# Patient Record
Sex: FEMALE | Race: WHITE | Employment: UNEMPLOYED | URBAN - METROPOLITAN AREA
[De-identification: names, ages, dates, MRNs, and addresses within clinical notes are randomized per-mention and may not be internally consistent; named-entity substitution may affect disease eponyms.]

---

## 2019-09-16 ENCOUNTER — CLINICAL SUPPORT (OUTPATIENT)
Dept: URGENT CARE | Facility: CLINIC | Age: 54
End: 2019-09-16
Payer: MEDICAID

## 2019-09-16 VITALS
WEIGHT: 268 LBS | DIASTOLIC BLOOD PRESSURE: 85 MMHG | HEIGHT: 64 IN | SYSTOLIC BLOOD PRESSURE: 175 MMHG | TEMPERATURE: 98 F | HEART RATE: 87 BPM | OXYGEN SATURATION: 96 % | BODY MASS INDEX: 45.75 KG/M2

## 2019-09-16 DIAGNOSIS — J18.9 PNEUMONIA OF RIGHT LOWER LOBE DUE TO INFECTIOUS ORGANISM: Primary | ICD-10-CM

## 2019-09-16 DIAGNOSIS — R05.9 COUGH: ICD-10-CM

## 2019-09-16 LAB
CTP QC/QA: YES
FLUAV AG NPH QL: NEGATIVE
FLUBV AG NPH QL: NEGATIVE

## 2019-09-16 PROCEDURE — 87804 INFLUENZA ASSAY W/OPTIC: CPT | Mod: QW,,, | Performed by: NURSE PRACTITIONER

## 2019-09-16 PROCEDURE — 87804 POCT INFLUENZA A/B: ICD-10-PCS | Mod: QW,,, | Performed by: NURSE PRACTITIONER

## 2019-09-16 PROCEDURE — 71046 X-RAY EXAM CHEST 2 VIEWS: CPT | Mod: S$GLB,,, | Performed by: NURSE PRACTITIONER

## 2019-09-16 PROCEDURE — 99204 OFFICE O/P NEW MOD 45 MIN: CPT | Mod: 25,S$GLB,, | Performed by: NURSE PRACTITIONER

## 2019-09-16 PROCEDURE — 99204 PR OFFICE/OUTPT VISIT, NEW, LEVL IV, 45-59 MIN: ICD-10-PCS | Mod: 25,S$GLB,, | Performed by: NURSE PRACTITIONER

## 2019-09-16 PROCEDURE — 71046 PR XRAY, CHEST, 2 VIEWS: ICD-10-PCS | Mod: S$GLB,,, | Performed by: NURSE PRACTITIONER

## 2019-09-16 RX ORDER — CHOLECALCIFEROL (VITAMIN D3) 10 MCG
TABLET ORAL DAILY
COMMUNITY

## 2019-09-16 RX ORDER — METOCLOPRAMIDE 5 MG/1
5 TABLET ORAL 4 TIMES DAILY
COMMUNITY

## 2019-09-16 RX ORDER — CYCLOBENZAPRINE HCL 10 MG
10 TABLET ORAL 3 TIMES DAILY PRN
COMMUNITY

## 2019-09-16 RX ORDER — BENAZEPRIL HYDROCHLORIDE 40 MG/1
40 TABLET ORAL DAILY
COMMUNITY

## 2019-09-16 RX ORDER — DICLOFENAC SODIUM 10 MG/G
2 GEL TOPICAL 4 TIMES DAILY
COMMUNITY

## 2019-09-16 RX ORDER — INSULIN PUMP SYRINGE, 3 ML
EACH MISCELLANEOUS
COMMUNITY

## 2019-09-16 RX ORDER — METFORMIN HYDROCHLORIDE EXTENDED-RELEASE TABLETS 500 MG/1
500 TABLET, FILM COATED, EXTENDED RELEASE ORAL
COMMUNITY

## 2019-09-16 RX ORDER — PROMETHAZINE HYDROCHLORIDE AND DEXTROMETHORPHAN HYDROBROMIDE 6.25; 15 MG/5ML; MG/5ML
5 SYRUP ORAL
Qty: 120 ML | Refills: 0 | Status: SHIPPED | OUTPATIENT
Start: 2019-09-16 | End: 2019-09-26

## 2019-09-16 RX ORDER — PANTOPRAZOLE SODIUM 40 MG/1
40 TABLET, DELAYED RELEASE ORAL DAILY
COMMUNITY

## 2019-09-16 RX ORDER — AZITHROMYCIN 250 MG/1
TABLET, FILM COATED ORAL
Qty: 6 TABLET | Refills: 0 | Status: SHIPPED | OUTPATIENT
Start: 2019-09-16

## 2019-09-16 RX ORDER — LEVOTHYROXINE SODIUM 50 UG/1
50 TABLET ORAL DAILY
COMMUNITY

## 2019-09-16 RX ORDER — BLOOD-GLUCOSE CONTROL, NORMAL
EACH MISCELLANEOUS
COMMUNITY

## 2019-09-16 RX ORDER — ALBUTEROL SULFATE 90 UG/1
2 AEROSOL, METERED RESPIRATORY (INHALATION) EVERY 4 HOURS PRN
Qty: 1 INHALER | Refills: 0 | Status: SHIPPED | OUTPATIENT
Start: 2019-09-16

## 2019-09-16 NOTE — PROGRESS NOTES
"Subjective:       Patient ID: Sanaz Barr is a 53 y.o. female.    Vitals:  height is 5' 4" (1.626 m) and weight is 121.6 kg (268 lb). Her oral temperature is 97.5 °F (36.4 °C). Her blood pressure is 175/85 (abnormal) and her pulse is 87. Her oxygen saturation is 96%.     Chief Complaint: Cough    Sanaz Barr is a 53 year old female presenting to the clinic with c/o body aches that began yesterday. She began having cough and has had fever up to 102 with nasal congestion as well. She has been taking mucinex, tylenol, and ibuprofen.     Cough   This is a new problem. The current episode started yesterday. The problem has been gradually worsening. The problem occurs constantly. The cough is productive of sputum. Associated symptoms include ear congestion, a fever, myalgias (body aches), nasal congestion, postnasal drip and rhinorrhea. Pertinent negatives include no chest pain, chills, headaches, rash, sore throat or shortness of breath. Treatments tried: Mucinex. The treatment provided no relief.       Constitution: Positive for fever. Negative for chills and fatigue.   HENT: Positive for postnasal drip. Negative for congestion and sore throat.    Neck: Negative for painful lymph nodes.   Cardiovascular: Negative for chest pain and leg swelling.   Eyes: Negative for double vision and blurred vision.   Respiratory: Positive for cough. Negative for shortness of breath.    Gastrointestinal: Negative for nausea, vomiting and diarrhea.   Genitourinary: Negative for dysuria, frequency, urgency and history of kidney stones.   Musculoskeletal: Positive for muscle ache (body aches). Negative for joint pain, joint swelling and muscle cramps.   Skin: Negative for color change, pale, rash and bruising.   Allergic/Immunologic: Negative for seasonal allergies.   Neurological: Negative for dizziness, history of vertigo, light-headedness, passing out and headaches.   Hematologic/Lymphatic: Negative for swollen lymph nodes. "   Psychiatric/Behavioral: Negative for nervous/anxious, sleep disturbance and depression. The patient is not nervous/anxious.        Objective:      Physical Exam   Constitutional: She is oriented to person, place, and time. She appears well-developed and well-nourished. She is cooperative.  Non-toxic appearance. She does not appear ill. No distress.   HENT:   Head: Normocephalic and atraumatic.   Right Ear: Hearing, tympanic membrane, external ear and ear canal normal.   Left Ear: Hearing, tympanic membrane, external ear and ear canal normal.   Nose: Nose normal. No mucosal edema, rhinorrhea or nasal deformity. No epistaxis. Right sinus exhibits no maxillary sinus tenderness and no frontal sinus tenderness. Left sinus exhibits no maxillary sinus tenderness and no frontal sinus tenderness.   Mouth/Throat: Uvula is midline, oropharynx is clear and moist and mucous membranes are normal. No trismus in the jaw. Normal dentition. No uvula swelling. No posterior oropharyngeal erythema.   Eyes: Conjunctivae and lids are normal. Right eye exhibits no discharge. Left eye exhibits no discharge. No scleral icterus.   Sclera clear bilat   Neck: Trachea normal, normal range of motion, full passive range of motion without pain and phonation normal. Neck supple.   Cardiovascular: Normal rate, regular rhythm, normal heart sounds, intact distal pulses and normal pulses.   Pulmonary/Chest: Effort normal and breath sounds normal. No respiratory distress.   Abdominal: Soft. Normal appearance and bowel sounds are normal. She exhibits no distension, no pulsatile midline mass and no mass. There is no tenderness.   Musculoskeletal: Normal range of motion. She exhibits no edema or deformity.   Neurological: She is alert and oriented to person, place, and time. She exhibits normal muscle tone. Coordination normal.   Skin: Skin is warm, dry and intact. She is not diaphoretic. No pallor.   Psychiatric: She has a normal mood and affect. Her  speech is normal and behavior is normal. Judgment and thought content normal. Cognition and memory are normal.   Nursing note and vitals reviewed.      Assessment:       1. Pneumonia of right lower lobe due to infectious organism    2. Cough        Plan:       Patient's xray independently interpreted by me and confirmed by radiology. Mild infiltrate in right lower lobe consistent with pneumonia. Will treat with azithromycin, albuterol, and promethazine DM. Do not suspect sepsis. She is not hypoxic or in any distress. Instructed patient to follow up with her PCP in one week for repeat CXR. Strict ER precautions discussed and patient verbalized understanding.     Pneumonia of right lower lobe due to infectious organism    Cough  -     POCT Influenza A/B  -     XR CHEST PA AND LATERAL; Future; Expected date: 09/16/2019    Other orders  -     azithromycin (ZITHROMAX Z-GREGORY) 250 MG tablet; Take 2 tablets PO QD on day one; take 1 tablet PO QD on days 2-5.  Dispense: 6 tablet; Refill: 0  -     promethazine-dextromethorphan (PROMETHAZINE-DM) 6.25-15 mg/5 mL Syrp; Take 5 mLs by mouth every 4 to 6 hours as needed (cough).  Dispense: 120 mL; Refill: 0  -     albuterol (PROVENTIL/VENTOLIN HFA) 90 mcg/actuation inhaler; Inhale 2 puffs into the lungs every 4 (four) hours as needed for Wheezing or Shortness of Breath.  Dispense: 1 Inhaler; Refill: 0

## 2019-09-16 NOTE — PATIENT INSTRUCTIONS
What is Pneumonia?    Pneumonia is a serious lung infection. Many cases of pneumonia are caused by bacteria or viruses. Fungi may also cause pneumonia, but this is less common.  You may also get pneumonia after another illness, such as a cold, flu, or bronchitis. Those most at risk include older adults, smokers, and people with long-term (chronic) health problems or weak immune systems.  Healthy lungs  · Air travels in and out of the lungs through tubes called airways.  · The tubes branch into smaller passages called bronchioles. These end in tiny sacs called alveoli.  · Blood vessels surrounding the alveoli take oxygen into the bloodstream. At the same time, the alveoli remove carbon dioxide (a waste gas) from the blood. The carbon dioxide is then exhaled.  When you have pneumonia  · Pneumonia causes the bronchioles and the alveoli to fill with excess mucus and become inflamed.  · Your bodys response may be to cough. This can help clear out the fluid.  · The fluid (or mucus) you cough up may appear green or dark yellow.  · The excess mucus may make you feel short of breath.  · The inflammation and infection may give you a fever.  What are the symptoms?  Symptoms of pneumonia can come without warning. At first, you may think you have a cold or flu. But symptoms may get worse quickly, turning into pneumonia. Symptoms can be different for bacterial and viral pneumonia. Common symptoms may include the following:  · Severe cough with green or yellow mucus that doesn't improve or that gets worse  · Fever and chills  · Nausea, vomiting or diarrhea  · Shortness of breath with normal daily activities  · Increased heart rate  · Chest pain or discomfort when breathing in or coughing  · Headache  · Excessive sweating and clammy skin  Date Last Reviewed: 12/1/2016  © 4106-1484 [employer]. [address] [place] [address], [place] [address]. All rights reserved. This information is not intended as a substitute for  professional medical care. Always follow your healthcare professional's instructions.        Treating Pneumonia  Pneumonia is an infection of one or both of the lungs. Pneumonia:  · Is usually caused by either a virus or a bacteria  · Can be very serious, especially in infants, young children, and older adults. Its also serious for those with other long-term health problems or weakened immune systems.  · Is sometimes treated at home and sometimes in the hospital    Antibiotic medicines  Antibiotics may be prescribed for pneumonia caused by bacteria. They may be pills (oral medicines), or shots (injections). Or they may be given by IV (intravenously) into a vein. If you are taking oral medicines at home:  · Fill your prescription and start taking your medicine as soon as you can.  · You will likely start to feel better in a day or 2, but dont stop taking the antibiotic.  · Use a pill organizer to help you remember to take your medicine.  · Let your healthcare provider know if you have side effects.  · Take your medicine exactly as directed on the label. Talk to your provider or pharmacist if you have any questions.  Antiviral medicines  Antiviral medicine may be prescribed for pneumonia caused by a virus. For example, antiviral medicine may be prescribed for pneumonia caused by the flu virus. Antibiotics do not work against viruses. If you are taking antiviral medicine at home:  · Fill your prescription and start taking your medicine as soon as you can.  · Talk with your provider or pharmacist about possible side effects.  · Take the medicine exactly as instructed.  To relieve symptoms  There are many medicines that can help relieve symptoms of pneumonia. Some are prescription and some are over-the-counter.  Your healthcare provider may recommend:  · Acetaminophen or ibuprofen to lower your fever and to lessen headache or other pain  · Cough medicine to loosen mucus or to reduce coughing  Make sure you check with  your healthcare provider or pharmacist before taking any over-the-counter medicines.  Special treatments  If you are hospitalized for pneumonia, you may have other therapies, including:  · Inhaled medicines to help with breathing or chest congestion  · Supplemental oxygen to increase low oxygen levels  Drink fluids and eat healthy  You should eat healthy to help your body fight the infection. Drinking a lot of fluids helps to replace fluids lost from fever and to loosen mucus in your chest.  · Diet. Make healthy food choices, including fruits and vegetables, lean meats and other proteins, 100% whole grain and low- or no-fat dairy products.  · Fluids. Drink at least 6 to 8 tall glasses a day. Water and 100% fruit or vegetable juice are best.  Get plenty of rest and sleep  You may be more tired than usual for a while. It is important to get enough sleep at night. Its also important to rest during the day. Talk with your healthcare provider if coughing or other symptoms are interfering with your sleep.  Preventing the spread of germs  The best thing you can do to prevent spreading germs is to wash your hands often. You should:  · Rub your hand with soap and water for 20 to 30 seconds.  · Clean in between your fingers, the backs of your hands, and around your nails.  · Dry your hands on a separate towel or use paper towels.  You should also:  · Keep alcohol-based hand  nearby.  · Make sure you also clean surfaces that you touch. Use a product that kills all types of germs.  · Stay away from others until you are feeling better.  When to call your healthcare provider  Call your healthcare provider if you have any of the following:  · Symptoms get worse  · Fever continues  · Shortness of breath gets worse  · Increased mucus or mucus that is darker in color  · Coughing gets worse  · Lips or fingers are bluish in color  · Side effects from your medicine   Date Last Reviewed: 12/1/2016  © 6471-2804 [employer]. [address] [place] [address], [place] [address]. All rights reserved. This information is not intended as a substitute for professional medical care. Always follow your healthcare professional's instructions.

## 2021-04-22 ENCOUNTER — TELEPHONE (OUTPATIENT)
Dept: RHEUMATOLOGY | Facility: CLINIC | Age: 56
End: 2021-04-22

## 2021-06-09 DIAGNOSIS — R20.2 PARESTHESIA OF SKIN: ICD-10-CM

## 2021-06-09 DIAGNOSIS — R42 DIZZINESS: ICD-10-CM

## 2021-06-09 DIAGNOSIS — R29.90 MULTIPLE NEUROLOGICAL SYMPTOMS: Primary | ICD-10-CM

## 2021-06-09 DIAGNOSIS — G25.2 ACTION TREMOR: ICD-10-CM

## 2021-06-09 DIAGNOSIS — R20.0 ANESTHESIA OF SKIN: ICD-10-CM

## 2021-06-17 ENCOUNTER — HOSPITAL ENCOUNTER (OUTPATIENT)
Dept: RADIOLOGY | Facility: HOSPITAL | Age: 56
Discharge: HOME | End: 2021-06-17
Attending: NURSE PRACTITIONER
Payer: MEDICAID

## 2021-06-17 DIAGNOSIS — G25.2 ACTION TREMOR: ICD-10-CM

## 2021-06-17 DIAGNOSIS — R20.0 ANESTHESIA OF SKIN: ICD-10-CM

## 2021-06-17 DIAGNOSIS — R20.2 PARESTHESIA OF SKIN: ICD-10-CM

## 2021-06-17 DIAGNOSIS — R29.90 MULTIPLE NEUROLOGICAL SYMPTOMS: ICD-10-CM

## 2021-06-17 DIAGNOSIS — R42 DIZZINESS: ICD-10-CM

## 2021-06-17 LAB
CREAT SERPL-MCNC: 0.8 MG/DL (ref 0.5–1.4)
SAMPLE: NORMAL

## 2021-06-17 PROCEDURE — 82565 ASSAY OF CREATININE: CPT | Mod: PO

## 2021-06-17 PROCEDURE — 25500020 PHARM REV CODE 255: Mod: PO | Performed by: NURSE PRACTITIONER

## 2021-06-17 PROCEDURE — 70553 MRI BRAIN STEM W/O & W/DYE: CPT | Mod: TC,PO

## 2021-06-17 PROCEDURE — A9585 GADOBUTROL INJECTION: HCPCS | Mod: PO | Performed by: NURSE PRACTITIONER

## 2021-06-17 PROCEDURE — 72156 MRI NECK SPINE W/O & W/DYE: CPT | Mod: TC,PO

## 2021-06-17 RX ORDER — GADOBUTROL 604.72 MG/ML
10 INJECTION INTRAVENOUS
Status: COMPLETED | OUTPATIENT
Start: 2021-06-17 | End: 2021-06-17

## 2021-06-17 RX ADMIN — GADOBUTROL 10 ML: 604.72 INJECTION INTRAVENOUS at 11:06

## 2022-09-15 ENCOUNTER — OFFICE VISIT (OUTPATIENT)
Dept: RHEUMATOLOGY | Facility: CLINIC | Age: 57
End: 2022-09-15
Payer: MEDICAID

## 2022-09-15 VITALS
HEIGHT: 64 IN | OXYGEN SATURATION: 98 % | DIASTOLIC BLOOD PRESSURE: 80 MMHG | RESPIRATION RATE: 20 BRPM | HEART RATE: 76 BPM | BODY MASS INDEX: 42.48 KG/M2 | SYSTOLIC BLOOD PRESSURE: 130 MMHG | WEIGHT: 248.81 LBS

## 2022-09-15 DIAGNOSIS — E66.01 MORBID OBESITY: ICD-10-CM

## 2022-09-15 DIAGNOSIS — Z79.1 NSAID LONG-TERM USE: ICD-10-CM

## 2022-09-15 DIAGNOSIS — Z71.89 COUNSELING AND COORDINATION OF CARE: ICD-10-CM

## 2022-09-15 DIAGNOSIS — M79.7 FIBROMYALGIA: Primary | ICD-10-CM

## 2022-09-15 PROCEDURE — 3008F PR BODY MASS INDEX (BMI) DOCUMENTED: ICD-10-PCS | Mod: CPTII,,, | Performed by: INTERNAL MEDICINE

## 2022-09-15 PROCEDURE — 3075F SYST BP GE 130 - 139MM HG: CPT | Mod: CPTII,,, | Performed by: INTERNAL MEDICINE

## 2022-09-15 PROCEDURE — 3079F PR MOST RECENT DIASTOLIC BLOOD PRESSURE 80-89 MM HG: ICD-10-PCS | Mod: CPTII,,, | Performed by: INTERNAL MEDICINE

## 2022-09-15 PROCEDURE — 3075F PR MOST RECENT SYSTOLIC BLOOD PRESS GE 130-139MM HG: ICD-10-PCS | Mod: CPTII,,, | Performed by: INTERNAL MEDICINE

## 2022-09-15 PROCEDURE — 1159F MED LIST DOCD IN RCRD: CPT | Mod: CPTII,,, | Performed by: INTERNAL MEDICINE

## 2022-09-15 PROCEDURE — 99999 PR PBB SHADOW E&M-EST. PATIENT-LVL V: CPT | Mod: PBBFAC,,, | Performed by: INTERNAL MEDICINE

## 2022-09-15 PROCEDURE — 1159F PR MEDICATION LIST DOCUMENTED IN MEDICAL RECORD: ICD-10-PCS | Mod: CPTII,,, | Performed by: INTERNAL MEDICINE

## 2022-09-15 PROCEDURE — 4010F ACE/ARB THERAPY RXD/TAKEN: CPT | Mod: CPTII,,, | Performed by: INTERNAL MEDICINE

## 2022-09-15 PROCEDURE — 99205 OFFICE O/P NEW HI 60 MIN: CPT | Mod: S$PBB,,, | Performed by: INTERNAL MEDICINE

## 2022-09-15 PROCEDURE — 4010F PR ACE/ARB THEARPY RXD/TAKEN: ICD-10-PCS | Mod: CPTII,,, | Performed by: INTERNAL MEDICINE

## 2022-09-15 PROCEDURE — 99999 PR PBB SHADOW E&M-EST. PATIENT-LVL V: ICD-10-PCS | Mod: PBBFAC,,, | Performed by: INTERNAL MEDICINE

## 2022-09-15 PROCEDURE — 3079F DIAST BP 80-89 MM HG: CPT | Mod: CPTII,,, | Performed by: INTERNAL MEDICINE

## 2022-09-15 PROCEDURE — 3008F BODY MASS INDEX DOCD: CPT | Mod: CPTII,,, | Performed by: INTERNAL MEDICINE

## 2022-09-15 PROCEDURE — 99215 OFFICE O/P EST HI 40 MIN: CPT | Mod: PBBFAC,PO | Performed by: INTERNAL MEDICINE

## 2022-09-15 PROCEDURE — 99205 PR OFFICE/OUTPT VISIT, NEW, LEVL V, 60-74 MIN: ICD-10-PCS | Mod: S$PBB,,, | Performed by: INTERNAL MEDICINE

## 2022-09-15 RX ORDER — GABAPENTIN 100 MG/1
100 CAPSULE ORAL NIGHTLY
Qty: 30 CAPSULE | Refills: 11 | Status: SHIPPED | OUTPATIENT
Start: 2022-09-15 | End: 2022-11-08

## 2022-09-15 RX ORDER — TRAZODONE HYDROCHLORIDE 100 MG/1
100 TABLET ORAL NIGHTLY
COMMUNITY
Start: 2022-09-14

## 2022-09-15 RX ORDER — HYDROCHLOROTHIAZIDE 12.5 MG/1
12.5 TABLET ORAL
COMMUNITY

## 2022-09-15 RX ORDER — PROPRANOLOL HYDROCHLORIDE 40 MG/1
40 TABLET ORAL DAILY PRN
COMMUNITY
Start: 2022-09-14

## 2022-09-15 RX ORDER — MELOXICAM 15 MG/1
15 TABLET ORAL DAILY
Qty: 30 TABLET | Refills: 6 | Status: SHIPPED | OUTPATIENT
Start: 2022-09-15 | End: 2022-11-08

## 2022-09-15 RX ORDER — ACETAMINOPHEN 500 MG
500 TABLET ORAL
COMMUNITY

## 2022-09-15 RX ORDER — CHLORTHALIDONE 25 MG/1
25 TABLET ORAL DAILY
COMMUNITY
Start: 2022-09-14

## 2022-09-15 RX ORDER — PROPRANOLOL HYDROCHLORIDE 20 MG/1
20 TABLET ORAL 2 TIMES DAILY
COMMUNITY
Start: 2022-08-31

## 2022-09-15 RX ORDER — METHOCARBAMOL 750 MG/1
750 TABLET, FILM COATED ORAL 4 TIMES DAILY PRN
Qty: 90 TABLET | Refills: 1 | Status: SHIPPED | OUTPATIENT
Start: 2022-09-15 | End: 2022-11-08

## 2022-09-15 RX ORDER — CYANOCOBALAMIN 1000 UG/ML
INJECTION, SOLUTION INTRAMUSCULAR; SUBCUTANEOUS
COMMUNITY
Start: 2022-09-14

## 2022-09-15 RX ORDER — POTASSIUM CHLORIDE 1.5 G/1.58G
20 POWDER, FOR SOLUTION ORAL
COMMUNITY

## 2022-09-15 NOTE — PROGRESS NOTES
RHEUMATOLOGY OUTPATIENT CLINIC NOTE    9/15/2022    Attending Rheumatologist: Erik Carrasco  Primary Care Provider: Primary Doctor No   Physician Requesting Consultation: Madyson Andrade, DO  [address] [place] [address]  Chief Complaint/Reason For Consultation:  No chief complaint on file.      Subjective:       HPI  Sanaz Barr is a 56 y.o. White female with medical history noted below who presents for evaluation of joint pain.     Patient presents for evaluation of joint pain and myalgias. She notes the myalgias date back to when she was 15 yo. Joint pain has been present over the last 25 years. Notes wide spread pain but worse areas are hands and knees. Reports Hx of Frozen shoulder bilaterally, has gone thru PT/CSI extensively, and has improved. Prior surgeries of the toes for hammer toes. Paraesthesias all overs. She reports jean horses all over as well. Occasional hand swelling. Morning stiffness lasting few hours. Long days worsen her pain, NSAIDs provide little relief. Brain fog/forgetful, dizziness, wt gain over the last 4-5 months, constipation/diarrhea, RLS, menopause now but prior menstrual irregularities, HA/Migraines, fatigue, poor sleep (on CPAP), Anxiety. No rash, fevers, wt loss, Raynaud's, Oral/Nasal Ulcers, SOB, Chest Pain, Depression. Reports Hx of Fibromyalgia, prior use of Lyrica, Gabapentin, Cymbalta, Elavil.     Review of Systems   Constitutional:  Positive for fatigue. Negative for chills, fever and unexpected weight change.   HENT:  Positive for trouble swallowing. Negative for mouth sores.    Eyes:  Positive for redness. Negative for eye dryness.   Respiratory:  Negative for cough and shortness of breath.    Cardiovascular:  Negative for chest pain.   Gastrointestinal:  Positive for constipation and diarrhea. Negative for abdominal distention, nausea and vomiting.   Genitourinary:  Negative for dysuria, genital sores and vaginal dryness.   Musculoskeletal:   Positive for arthralgias, back pain, joint swelling, leg pain and myalgias. Negative for gait problem, neck pain, neck stiffness and joint deformity.   Integumentary:  Negative for rash.   Neurological:  Positive for numbness and headaches. Negative for weakness.   Hematological:  Negative for adenopathy. Bruises/bleeds easily.   Psychiatric/Behavioral:  Positive for decreased concentration and sleep disturbance. Negative for confusion. The patient is nervous/anxious.    All other systems reviewed and are negative.     Chronic comorbid conditions affecting medical decision making today:  Past Medical History:   Diagnosis Date    Diabetes mellitus, type 2      Past Surgical History:   Procedure Laterality Date     SECTION      CHOLECYSTECTOMY      HAND SURGERY      Polyh removal       No family history on file.  Social History     Substance and Sexual Activity   Alcohol Use Not on file     Social History     Tobacco Use   Smoking Status Not on file   Smokeless Tobacco Not on file     Social History     Substance and Sexual Activity   Drug Use Not on file       Current Outpatient Medications:     acetaminophen (TYLENOL) 500 MG tablet, Take 500 mg by mouth., Disp: , Rfl:     benazepril (LOTENSIN) 40 MG tablet, Take 40 mg by mouth once daily., Disp: , Rfl:     blood sugar diagnostic (ONETOUCH ULTRA BLUE TEST STRIP MISC), by Misc.(Non-Drug; Combo Route) route., Disp: , Rfl:     blood-glucose meter kit, by Other route. Use as instructed, Disp: , Rfl:     chlorthalidone (HYGROTEN) 25 MG Tab, Take 25 mg by mouth once daily., Disp: , Rfl:     cholecalciferol, vitamin D3, capsule/tablet, Take by mouth once daily., Disp: , Rfl:     cyanocobalamin 1,000 mcg/mL injection, Inject into the muscle., Disp: , Rfl:     cyclobenzaprine (FLEXERIL) 10 MG tablet, Take 10 mg by mouth 3 (three) times daily as needed for Muscle spasms., Disp: , Rfl:     hydroCHLOROthiazide (HYDRODIURIL) 12.5 MG Tab, Take 12.5 mg by mouth., Disp: ,  Rfl:     lancets 30 gauge Misc, by Misc.(Non-Drug; Combo Route) route., Disp: , Rfl:     levothyroxine (SYNTHROID) 50 MCG tablet, Take 50 mcg by mouth once daily., Disp: , Rfl:     MAGNESIUM ORAL, Take by mouth., Disp: , Rfl:     potassium chloride (KLOR-CON) 20 mEq Pack, Take 20 mEq by mouth., Disp: , Rfl:     propranoloL (INDERAL) 20 MG tablet, Take 20 mg by mouth 2 (two) times daily., Disp: , Rfl:     propranoloL (INDERAL) 40 MG tablet, Take 40 mg by mouth daily as needed., Disp: , Rfl:     semaglutide (OZEMPIC) 0.25 mg or 0.5 mg(2 mg/1.5 mL) PnIj, Inject 0.25 mg into the skin every 7 days., Disp: , Rfl:     traZODone (DESYREL) 100 MG tablet, Take 100 mg by mouth every evening., Disp: , Rfl:     albuterol (PROVENTIL/VENTOLIN HFA) 90 mcg/actuation inhaler, Inhale 2 puffs into the lungs every 4 (four) hours as needed for Wheezing or Shortness of Breath., Disp: 1 Inhaler, Rfl: 0    azithromycin (ZITHROMAX Z-GREGORY) 250 MG tablet, Take 2 tablets PO QD on day one; take 1 tablet PO QD on days 2-5., Disp: 6 tablet, Rfl: 0    diclofenac sodium (VOLTAREN) 1 % Gel, Apply 2 g topically 4 (four) times daily., Disp: , Rfl:     gabapentin (NEURONTIN) 100 MG capsule, Take 1 capsule (100 mg total) by mouth every evening., Disp: 30 capsule, Rfl: 11    meloxicam (MOBIC) 15 MG tablet, Take 1 tablet (15 mg total) by mouth once daily., Disp: 30 tablet, Rfl: 6    metFORMIN (FORTAMET) 500 mg 24 hr tablet, Take 500 mg by mouth daily with breakfast., Disp: , Rfl:     methocarbamoL (ROBAXIN) 750 MG Tab, Take 1 tablet (750 mg total) by mouth 4 (four) times daily as needed (myalgia)., Disp: 90 tablet, Rfl: 1    metoclopramide HCl (REGLAN) 5 MG tablet, Take 5 mg by mouth 4 (four) times daily., Disp: , Rfl:     pantoprazole (PROTONIX) 40 MG tablet, Take 40 mg by mouth once daily., Disp: , Rfl:      Objective:         Vitals:    09/15/22 1344   BP: 130/80   Pulse: 76   Resp: 20     Physical Exam  Can make fist, no synovitis, puffy MCP with  TTP  Shoulder with limited ROM  Knee crepitus   Negative ankle/MTP  Tender Points:  No   Yes  []    [x]   Low cervical anterior aspect    []    [x]   Costochondral Junction   []    [x]   Lateral Epicondyle  []    [x]   Suboccipital   []    [x]   Trapezius   []    [x]   Supraspinatus   []    [x]   Gluteal   []    [x]   Greater trochanter  []    [x]   Knee    Reviewed old and all outside pertinent medical records available.    All lab results personally reviewed and interpreted by me.  No results found for: WBC, HGB, HCT, MCV, MCH, MCHC, RDW, PLT, MPV, NEUTROABS, LYMPHOABS, MONOABS, EOSINOABS, BASOSABS, NEUTROPCT, LYMPHOPCT, MONOPCT, EOSINOPCT, BASOPCT, DIFFTYPE, RBCMORPHOLOG, PLTEST    No results found for: NA, K, CL, CO2, GLU, BUN, LABCREA, CALCIUM, PROT, ALBUMIN, BILITOT, AST, ALKPHOS, ALT, GFRAA, GFRNONAA    No results found for: COLORU, APPEARANCEUA, SPECGRAV, PHUR, PHUA, PROTEINUA, GLUCOSEU, KETONESU, BLOODU, LEUKOCYTESUR, NITRITE, UROBILINOGEN    No results found for: CRP    No results found for: SEDRATE, ERYTHROCYTES    No results found for: SARTHAK, RF, SEDRATE    No components found for: 25OHVITDTOT, 27HQKELE1, 19APTNAR5, METHODNOTE    No results found for: URICACID    No components found for: TSPOTTB        Imaging:  All imaging reviewed and independently interpreted by me.         ASSESSMENT / PLAN:     Sanaz Barr is a 56 y.o. White female with:      1. Fibromyalgia  - patients complaints consistent with her history of Fibromyalgia, the question remains is there another process driving this  - work up as below   - discussed diagnosis and management  - give retrial of Gabapentin, SE discussed  - trial of Meloxicam, SE discussed  - will try switching Flexeril to Robaxin   - sleep hygiene and stress management discussed  - reassurance and exercise   - C-Reactive Protein; Future  - SARTHAK Screen w/Reflex; Future  - Rheumatoid Factor; Future  - CBC Auto Differential; Future  - Comprehensive Metabolic Panel;  Future  - Cyclic Citrullinated Peptide Antibody, IgG; Future  - Sjogrens syndrome-A extractable nuclear antibody; Future  - Sjogrens syndrome-B extractable nuclear antibody; Future  - Sedimentation rate; Future  - Vitamin D; Future  - Uric Acid; Future  - TSH; Future  - Hepatitis C Antibody; Future  - Hepatitis B Surface Antigen; Future  - Hepatitis B Surface Ab, Qualitative; Future  - CK; Future  - XR Arthritis Survey; Future  - Quantiferon Gold TB; Future  - HIV 1/2 Ag/Ab (4th Gen); Future  - gabapentin (NEURONTIN) 100 MG capsule; Take 1 capsule (100 mg total) by mouth every evening.  Dispense: 30 capsule; Refill: 11  - meloxicam (MOBIC) 15 MG tablet; Take 1 tablet (15 mg total) by mouth once daily.  Dispense: 30 tablet; Refill: 6  - methocarbamoL (ROBAXIN) 750 MG Tab; Take 1 tablet (750 mg total) by mouth 4 (four) times daily as needed (myalgia).  Dispense: 90 tablet; Refill: 1    2. Chronic NSAID use  - no history of GI bleed or coronary artery disease.  - previous labs without features of CKD.  - clinical significance side effect of long-term NSAID use discussed in detail.  - recommended for alternative therapies for pain management.    3. Other specified counseling  - over 10 minutes spent regarding below topics:  - Immunization counseling done.  - Weight loss counseling done.  - Nutrition and exercise counseling.  - Limitation of alcohol consumption.  - Regular exercise:  Aerobic and resistance.  - Medication counseling provided.    4. Morbid Obesity  - would benefit from decreasing at least 10% of body weight.  - recommended goal of losing 1 lb per week.  - consider nutritionist evaluation.      Follow up in about 8 weeks (around 11/10/2022).    Method of contact with patient concerns: [facility]    Disclaimer:  This note is prepared using voice recognition software and as such is likely to have errors and has not been proof read. Please contact me for questions.     Time spent: 60 minutes in  face to face discussion concerning diagnosis, prognosis, review of lab and test results, benefits of treatment as well as management of disease, counseling of patient and coordination of care between various health care providers.  Greater than half the time spent was used for coordination of care and counseling of patient.    Erik Carrasco M.D.  Rheumatology Department   [facility]

## 2022-09-16 ENCOUNTER — HOSPITAL ENCOUNTER (OUTPATIENT)
Dept: RADIOLOGY | Facility: CLINIC | Age: 57
Discharge: HOME | End: 2022-09-16
Attending: INTERNAL MEDICINE
Payer: MEDICAID

## 2022-09-16 DIAGNOSIS — M79.7 FIBROMYALGIA: ICD-10-CM

## 2022-09-16 PROCEDURE — 77077 JOINT SURVEY SINGLE VIEW: CPT | Mod: TC,PO

## 2022-09-16 PROCEDURE — 77077 JOINT SURVEY SINGLE VIEW: CPT | Mod: 26,,, | Performed by: RADIOLOGY

## 2022-09-16 PROCEDURE — 77077 XR ARTHRITIS SURVEY: ICD-10-PCS | Mod: 26,,, | Performed by: RADIOLOGY

## 2022-11-08 ENCOUNTER — OFFICE VISIT (OUTPATIENT)
Dept: RHEUMATOLOGY | Facility: CLINIC | Age: 57
End: 2022-11-08
Payer: MEDICAID

## 2022-11-08 VITALS
HEART RATE: 72 BPM | SYSTOLIC BLOOD PRESSURE: 124 MMHG | OXYGEN SATURATION: 96 % | HEIGHT: 64 IN | RESPIRATION RATE: 20 BRPM | DIASTOLIC BLOOD PRESSURE: 74 MMHG | WEIGHT: 241.81 LBS | BODY MASS INDEX: 41.28 KG/M2

## 2022-11-08 DIAGNOSIS — Z79.1 NSAID LONG-TERM USE: ICD-10-CM

## 2022-11-08 DIAGNOSIS — E66.01 MORBID OBESITY: ICD-10-CM

## 2022-11-08 DIAGNOSIS — M79.7 FIBROMYALGIA: Primary | ICD-10-CM

## 2022-11-08 DIAGNOSIS — Z71.89 COUNSELING AND COORDINATION OF CARE: ICD-10-CM

## 2022-11-08 DIAGNOSIS — M15.9 PRIMARY OSTEOARTHRITIS INVOLVING MULTIPLE JOINTS: ICD-10-CM

## 2022-11-08 PROCEDURE — 99214 OFFICE O/P EST MOD 30 MIN: CPT | Mod: PBBFAC,PO | Performed by: INTERNAL MEDICINE

## 2022-11-08 PROCEDURE — 99215 OFFICE O/P EST HI 40 MIN: CPT | Mod: S$PBB,,, | Performed by: INTERNAL MEDICINE

## 2022-11-08 PROCEDURE — 99215 PR OFFICE/OUTPT VISIT, EST, LEVL V, 40-54 MIN: ICD-10-PCS | Mod: S$PBB,,, | Performed by: INTERNAL MEDICINE

## 2022-11-08 PROCEDURE — 3078F PR MOST RECENT DIASTOLIC BLOOD PRESSURE < 80 MM HG: ICD-10-PCS | Mod: CPTII,,, | Performed by: INTERNAL MEDICINE

## 2022-11-08 PROCEDURE — 3074F SYST BP LT 130 MM HG: CPT | Mod: CPTII,,, | Performed by: INTERNAL MEDICINE

## 2022-11-08 PROCEDURE — 1159F MED LIST DOCD IN RCRD: CPT | Mod: CPTII,,, | Performed by: INTERNAL MEDICINE

## 2022-11-08 PROCEDURE — 3078F DIAST BP <80 MM HG: CPT | Mod: CPTII,,, | Performed by: INTERNAL MEDICINE

## 2022-11-08 PROCEDURE — 4010F PR ACE/ARB THEARPY RXD/TAKEN: ICD-10-PCS | Mod: CPTII,,, | Performed by: INTERNAL MEDICINE

## 2022-11-08 PROCEDURE — 3008F BODY MASS INDEX DOCD: CPT | Mod: CPTII,,, | Performed by: INTERNAL MEDICINE

## 2022-11-08 PROCEDURE — 4010F ACE/ARB THERAPY RXD/TAKEN: CPT | Mod: CPTII,,, | Performed by: INTERNAL MEDICINE

## 2022-11-08 PROCEDURE — 3008F PR BODY MASS INDEX (BMI) DOCUMENTED: ICD-10-PCS | Mod: CPTII,,, | Performed by: INTERNAL MEDICINE

## 2022-11-08 PROCEDURE — 1159F PR MEDICATION LIST DOCUMENTED IN MEDICAL RECORD: ICD-10-PCS | Mod: CPTII,,, | Performed by: INTERNAL MEDICINE

## 2022-11-08 PROCEDURE — 3074F PR MOST RECENT SYSTOLIC BLOOD PRESSURE < 130 MM HG: ICD-10-PCS | Mod: CPTII,,, | Performed by: INTERNAL MEDICINE

## 2022-11-08 PROCEDURE — 99999 PR PBB SHADOW E&M-EST. PATIENT-LVL IV: CPT | Mod: PBBFAC,,, | Performed by: INTERNAL MEDICINE

## 2022-11-08 PROCEDURE — 99999 PR PBB SHADOW E&M-EST. PATIENT-LVL IV: ICD-10-PCS | Mod: PBBFAC,,, | Performed by: INTERNAL MEDICINE

## 2022-11-08 RX ORDER — TIZANIDINE 4 MG/1
4 TABLET ORAL EVERY 6 HOURS PRN
Qty: 40 TABLET | Refills: 0 | Status: SHIPPED | OUTPATIENT
Start: 2022-11-08 | End: 2022-11-18

## 2022-11-08 RX ORDER — NABUMETONE 750 MG/1
750 TABLET, FILM COATED ORAL 2 TIMES DAILY
Qty: 60 TABLET | Refills: 6 | Status: SHIPPED | OUTPATIENT
Start: 2022-11-08 | End: 2022-11-09 | Stop reason: SDUPTHER

## 2022-11-08 NOTE — PROGRESS NOTES
RHEUMATOLOGY OUTPATIENT CLINIC NOTE    11/8/2022    Attending Rheumatologist: Erik Carrasco  Primary Care Provider: Primary Doctor No   Physician Requesting Consultation: No referring provider defined for this encounter.  Chief Complaint/Reason For Consultation:  No chief complaint on file.      Subjective:       HPI  Sanaz Barr is a 57 y.o. White female with medical history noted below who presents for evaluation of joint pain.   Patient presents for evaluation of joint pain and myalgias. She notes the myalgias date back to when she was 15 yo. Joint pain has been present over the last 25 years. Notes wide spread pain but worse areas are hands and knees. Reports Hx of Frozen shoulder bilaterally, has gone thru PT/CSI extensively, and has improved. Prior surgeries of the toes for hammer toes. Paraesthesias all overs. She reports jean horses all over as well. Occasional hand swelling. Morning stiffness lasting few hours. Long days worsen her pain, NSAIDs provide little relief. Brain fog/forgetful, dizziness, wt gain over the last 4-5 months, constipation/diarrhea, RLS, menopause now but prior menstrual irregularities, HA/Migraines, fatigue, poor sleep (on CPAP), Anxiety. No rash, fevers, wt loss, Raynaud's, Oral/Nasal Ulcers, SOB, Chest Pain, Depression. Reports Hx of Fibromyalgia, prior use of Lyrica, Gabapentin, Cymbalta, Elavil.     Today  Patient here for follow up.   Last visit presented for evaluation of joint pain and had extensive labs obtained. Also given Meloxicam, gabapentin and Robaxin. She notes stopping the Gabapentin due to low BP. Notes the other meds have not helped for pain or myalgias. Did research on low dose naltrexone and wants to try it.     Review of Systems   Constitutional:  Positive for fatigue. Negative for chills, fever and unexpected weight change.   HENT:  Positive for trouble swallowing. Negative for mouth sores.    Eyes:  Positive for redness. Negative for eye  dryness.   Respiratory:  Negative for cough and shortness of breath.    Cardiovascular:  Negative for chest pain.   Gastrointestinal:  Positive for constipation and diarrhea. Negative for abdominal distention, nausea and vomiting.   Genitourinary:  Negative for dysuria, genital sores and vaginal dryness.   Musculoskeletal:  Positive for arthralgias, back pain, joint swelling, leg pain and myalgias. Negative for gait problem, neck pain, neck stiffness and joint deformity.   Integumentary:  Negative for rash.   Neurological:  Positive for numbness and headaches. Negative for weakness.   Hematological:  Negative for adenopathy. Bruises/bleeds easily.   Psychiatric/Behavioral:  Positive for decreased concentration and sleep disturbance. Negative for confusion. The patient is nervous/anxious.    All other systems reviewed and are negative.     Chronic comorbid conditions affecting medical decision making today:  Past Medical History:   Diagnosis Date    Diabetes mellitus, type 2      Past Surgical History:   Procedure Laterality Date     SECTION      CHOLECYSTECTOMY      HAND SURGERY      Polyh removal       No family history on file.  Social History     Substance and Sexual Activity   Alcohol Use Not on file     Social History     Tobacco Use   Smoking Status Not on file   Smokeless Tobacco Not on file     Social History     Substance and Sexual Activity   Drug Use Not on file       Current Outpatient Medications:     acetaminophen (TYLENOL) 500 MG tablet, Take 500 mg by mouth., Disp: , Rfl:     albuterol (PROVENTIL/VENTOLIN HFA) 90 mcg/actuation inhaler, Inhale 2 puffs into the lungs every 4 (four) hours as needed for Wheezing or Shortness of Breath., Disp: 1 Inhaler, Rfl: 0    azithromycin (ZITHROMAX Z-GREGORY) 250 MG tablet, Take 2 tablets PO QD on day one; take 1 tablet PO QD on days 2-5., Disp: 6 tablet, Rfl: 0    benazepril (LOTENSIN) 40 MG tablet, Take 40 mg by mouth once daily., Disp: , Rfl:     blood sugar  diagnostic (ONETOUCH ULTRA BLUE TEST STRIP MISC), by Misc.(Non-Drug; Combo Route) route., Disp: , Rfl:     blood-glucose meter kit, by Other route. Use as instructed, Disp: , Rfl:     chlorthalidone (HYGROTEN) 25 MG Tab, Take 25 mg by mouth once daily., Disp: , Rfl:     cholecalciferol, vitamin D3, capsule/tablet, Take by mouth once daily., Disp: , Rfl:     cyanocobalamin 1,000 mcg/mL injection, Inject into the muscle., Disp: , Rfl:     cyclobenzaprine (FLEXERIL) 10 MG tablet, Take 10 mg by mouth 3 (three) times daily as needed for Muscle spasms., Disp: , Rfl:     diclofenac sodium (VOLTAREN) 1 % Gel, Apply 2 g topically 4 (four) times daily., Disp: , Rfl:     hydroCHLOROthiazide (HYDRODIURIL) 12.5 MG Tab, Take 12.5 mg by mouth., Disp: , Rfl:     lancets 30 gauge Misc, by Misc.(Non-Drug; Combo Route) route., Disp: , Rfl:     levothyroxine (SYNTHROID) 50 MCG tablet, Take 50 mcg by mouth once daily., Disp: , Rfl:     MAGNESIUM ORAL, Take by mouth., Disp: , Rfl:     metFORMIN (FORTAMET) 500 mg 24 hr tablet, Take 500 mg by mouth daily with breakfast., Disp: , Rfl:     metoclopramide HCl (REGLAN) 5 MG tablet, Take 5 mg by mouth 4 (four) times daily., Disp: , Rfl:     nabumetone (RELAFEN) 750 MG tablet, Take 1 tablet (750 mg total) by mouth 2 (two) times daily., Disp: 60 tablet, Rfl: 6    natrexone tablet 4 mg, Take 1 tablet (4 mg total) by mouth every evening., Disp: 30 tablet, Rfl: 1    pantoprazole (PROTONIX) 40 MG tablet, Take 40 mg by mouth once daily., Disp: , Rfl:     potassium chloride (KLOR-CON) 20 mEq Pack, Take 20 mEq by mouth., Disp: , Rfl:     propranoloL (INDERAL) 20 MG tablet, Take 20 mg by mouth 2 (two) times daily., Disp: , Rfl:     propranoloL (INDERAL) 40 MG tablet, Take 40 mg by mouth daily as needed., Disp: , Rfl:     semaglutide (OZEMPIC) 0.25 mg or 0.5 mg(2 mg/1.5 mL) PnIj, Inject 0.25 mg into the skin every 7 days., Disp: , Rfl:     tiZANidine (ZANAFLEX) 4 MG tablet, Take 1 tablet (4 mg total) by  mouth every 6 (six) hours as needed (myalgia/spasms)., Disp: 40 tablet, Rfl: 0    traZODone (DESYREL) 100 MG tablet, Take 100 mg by mouth every evening., Disp: , Rfl:      Objective:         Vitals:    11/08/22 1126   BP: 124/74   Pulse: 72   Resp: 20     Physical Exam  Can make fist, no synovitis, puffy MCP with TTP  Shoulder with limited ROM  Knee crepitus   Negative ankle/MTP  Tender Points:  No   Yes  []    [x]   Low cervical anterior aspect    []    [x]   Costochondral Junction   []    [x]   Lateral Epicondyle  []    [x]   Suboccipital   []    [x]   Trapezius   []    [x]   Supraspinatus   []    [x]   Gluteal   []    [x]   Greater trochanter  []    [x]   Knee    Reviewed old and all outside pertinent medical records available.    All lab results personally reviewed and interpreted by me.  Lab Results   Component Value Date    WBC 6.52 09/16/2022    HGB 12.8 09/16/2022    HCT 39.1 09/16/2022    MCV 92 09/16/2022    MCH 30.0 09/16/2022    MCHC 32.7 09/16/2022    RDW 14.2 09/16/2022     09/16/2022    MPV 11.3 09/16/2022       Lab Results   Component Value Date     09/16/2022    K 3.6 09/16/2022     09/16/2022    CO2 30 (H) 09/16/2022     (H) 09/16/2022    BUN 20 09/16/2022    CALCIUM 9.3 09/16/2022    PROT 6.7 09/16/2022    ALBUMIN 3.9 09/16/2022    BILITOT 1.0 09/16/2022    AST 17 09/16/2022    ALKPHOS 67 09/16/2022    ALT 22 09/16/2022       No results found for: COLORU, APPEARANCEUA, SPECGRAV, PHUR, PHUA, PROTEINUA, GLUCOSEU, KETONESU, BLOODU, LEUKOCYTESUR, NITRITE, UROBILINOGEN    Lab Results   Component Value Date    CRP 3.8 09/16/2022       Lab Results   Component Value Date    SEDRATE 11 09/16/2022       Lab Results   Component Value Date    RF <13.0 09/16/2022    SEDRATE 11 09/16/2022       No components found for: 25OHVITDTOT, 99UQSJQL6, 65DNYLJK4, METHODNOTE    Lab Results   Component Value Date    URICACID 8.0 (H) 09/16/2022       No components found for:  TSPOTTB        Imaging:  All imaging reviewed and independently interpreted by me.         ASSESSMENT / PLAN:     Sanaz Barr is a 57 y.o. White female with:      1. Fibromyalgia  - stable  - Gabapentin stopped due to low BP, Robaxin and Flexeril lost efficacy, Meloxicam no change in pain  - trial of Nabumetone, SE discussed    - trial of Zanaflex, SE discussed  - trial of Low dose Naltrexone, SE discussed  - sleep hygiene and stress management reinforced   - reassurance and exercise     2. Osteoarthritis  - in addition to FM she has OA which is adding to pain and will make pain goals harder to achieve  - discussed diagnosis and management  - NSAIDs/Tylenol PRN  - wt loss  - reassurance and exercise     3. Chronic NSAID use  - no history of GI bleed or coronary artery disease.  - previous labs without features of CKD.  - clinical significance side effect of long-term NSAID use discussed in detail.  - recommended for alternative therapies for pain management.    4. Other specified counseling  - over 10 minutes spent regarding below topics:  - Immunization counseling done.  - Weight loss counseling done.  - Nutrition and exercise counseling.  - Limitation of alcohol consumption.  - Regular exercise:  Aerobic and resistance.  - Medication counseling provided.    5. Morbid Obesity  - would benefit from decreasing at least 10% of body weight.  - recommended goal of losing 1 lb per week.  - consider nutritionist evaluation.      Follow up in about 3 months (around 2/8/2023).    Method of contact with patient concerns: [facility]    Disclaimer:  This note is prepared using voice recognition software and as such is likely to have errors and has not been proof read. Please contact me for questions.     Time spent: 40 minutes in face to face discussion concerning diagnosis, prognosis, review of lab and test results, benefits of treatment as well as management of disease, counseling of patient and coordination  of care between various health care providers.  Greater than half the time spent was used for coordination of care and counseling of patient.    Erik Carrasco M.D.  Rheumatology Department   [facility]

## 2022-11-08 NOTE — PROGRESS NOTES
Answers submitted by the patient for this visit:  Rheumatology Questionnaire (Submitted on 11/1/2022)  fever: No  eye redness: No  mouth sores: Yes  headaches: Yes  shortness of breath: No  chest pain: No  trouble swallowing: Yes  diarrhea: Yes  constipation: Yes  unexpected weight change: No  genital sore: No  dysuria: No  During the last 3 days, have you had a skin rash?: Yes  Bruises or bleeds easily: No  cough: No

## 2022-11-09 DIAGNOSIS — M79.7 FIBROMYALGIA: ICD-10-CM

## 2022-11-09 RX ORDER — NABUMETONE 750 MG/1
750 TABLET, FILM COATED ORAL 2 TIMES DAILY
Qty: 60 TABLET | Refills: 6 | Status: SHIPPED | OUTPATIENT
Start: 2022-11-09 | End: 2023-01-20 | Stop reason: SDUPTHER

## 2023-04-28 ENCOUNTER — OFFICE VISIT (OUTPATIENT)
Dept: URGENT CARE | Facility: CLINIC | Age: 58
End: 2023-04-28
Payer: MEDICAID

## 2023-04-28 VITALS
OXYGEN SATURATION: 97 % | BODY MASS INDEX: 40.97 KG/M2 | WEIGHT: 240 LBS | TEMPERATURE: 98 F | DIASTOLIC BLOOD PRESSURE: 85 MMHG | RESPIRATION RATE: 16 BRPM | SYSTOLIC BLOOD PRESSURE: 165 MMHG | HEART RATE: 64 BPM | HEIGHT: 64 IN

## 2023-04-28 DIAGNOSIS — H69.92 ACUTE DYSFUNCTION OF LEFT EUSTACHIAN TUBE: ICD-10-CM

## 2023-04-28 DIAGNOSIS — J06.9 VIRAL URI WITH COUGH: Primary | ICD-10-CM

## 2023-04-28 DIAGNOSIS — H92.02 ACUTE OTALGIA, LEFT: ICD-10-CM

## 2023-04-28 DIAGNOSIS — Z86.39 HISTORY OF DIABETES MELLITUS, TYPE II: ICD-10-CM

## 2023-04-28 DIAGNOSIS — Z86.79 HISTORY OF HYPERTENSION: ICD-10-CM

## 2023-04-28 PROCEDURE — 99204 OFFICE O/P NEW MOD 45 MIN: CPT | Mod: S$GLB,,, | Performed by: NURSE PRACTITIONER

## 2023-04-28 PROCEDURE — 99204 PR OFFICE/OUTPT VISIT, NEW, LEVL IV, 45-59 MIN: ICD-10-PCS | Mod: S$GLB,,, | Performed by: NURSE PRACTITIONER

## 2023-04-28 RX ORDER — BENZONATATE 100 MG/1
100 CAPSULE ORAL 3 TIMES DAILY PRN
Qty: 21 CAPSULE | Refills: 0 | Status: SHIPPED | OUTPATIENT
Start: 2023-04-28 | End: 2023-05-05

## 2023-04-28 RX ORDER — PROMETHAZINE HYDROCHLORIDE AND DEXTROMETHORPHAN HYDROBROMIDE 6.25; 15 MG/5ML; MG/5ML
5 SYRUP ORAL NIGHTLY PRN
Qty: 118 ML | Refills: 0 | Status: SHIPPED | OUTPATIENT
Start: 2023-04-28 | End: 2023-05-08

## 2023-04-28 RX ORDER — AZELASTINE 1 MG/ML
1 SPRAY, METERED NASAL 2 TIMES DAILY
Qty: 30 ML | Refills: 0 | Status: SHIPPED | OUTPATIENT
Start: 2023-04-28 | End: 2024-04-27

## 2023-04-28 RX ORDER — LEVOCETIRIZINE DIHYDROCHLORIDE 5 MG/1
5 TABLET, FILM COATED ORAL DAILY PRN
Qty: 7 TABLET | Refills: 0 | Status: SHIPPED | OUTPATIENT
Start: 2023-04-28 | End: 2023-11-29

## 2023-04-28 RX ORDER — GUAIFENESIN AND DEXTROMETHORPHAN HYDROBROMIDE 20; 400 MG/1; MG/1
1 TABLET ORAL EVERY 4 HOURS PRN
Qty: 30 EACH | Refills: 0 | Status: SHIPPED | OUTPATIENT
Start: 2023-04-28 | End: 2023-05-08

## 2023-04-28 RX ORDER — DEXAMETHASONE SODIUM PHOSPHATE 4 MG/ML
8 INJECTION, SOLUTION INTRA-ARTICULAR; INTRALESIONAL; INTRAMUSCULAR; INTRAVENOUS; SOFT TISSUE
Status: COMPLETED | OUTPATIENT
Start: 2023-04-28 | End: 2023-04-28

## 2023-04-28 RX ADMIN — DEXAMETHASONE SODIUM PHOSPHATE 8 MG: 4 INJECTION, SOLUTION INTRA-ARTICULAR; INTRALESIONAL; INTRAMUSCULAR; INTRAVENOUS; SOFT TISSUE at 11:04

## 2023-04-28 NOTE — PATIENT INSTRUCTIONS
Increase clear fluid intake  Stop all current over the counter cough, cold, flu medicine  Tylenol/motrin otc for fever or pain  Start Astelin nasal spray and Xyzal tablets for sinus congestion and pressure.  Take Mucinex DM as needed for chest congestion and coughing. Take mucinex with a full glass of water at each dose  Take Tessalon Perles for excessive daytime coughing  May take promethazine dm at bedtime for excessive nightly cough  Add a humidifier to your room at bedtime for respiratory comfort.  Saltwater gargles 4 x daily and OTC anesthetic throat lozenges for sore throat. May also add honey based cough syrup  Follow up with PCP  Go immediately to the nearest emergency room for shortness of breath, chest pain,  or other emergent concern.  Return to clinic for new, worse, or unresolving symptoms

## 2023-04-28 NOTE — PROGRESS NOTES
"Subjective:      Patient ID: Sanaz Barr is a 57 y.o. female.    Vitals:  height is 5' 4" (1.626 m) and weight is 108.9 kg (240 lb). Her temperature is 98.1 °F (36.7 °C). Her blood pressure is 165/85 (abnormal) and her pulse is 64. Her respiration is 16 and oxygen saturation is 97%.     Chief Complaint: Cough and Otalgia    57-year-old female seen today for cough and left ear pain.  She states approximately 1 week ago she had un epigastric illness which was followed by this cough and sinus congestion.  States she has tried multiple over-the-counter medications without improvement, she states that she began to have left ear pain yesterday that is accompanied by popping in squeaking which she moves her jaw.    Constitution: Negative for chills and fever.   HENT:  Positive for ear pain. Negative for ear discharge, congestion and sore throat.    Neck: Negative for painful lymph nodes.   Cardiovascular:  Negative for chest pain, palpitations and sob on exertion.   Respiratory:  Positive for cough. Negative for sputum production, shortness of breath and stridor.    Gastrointestinal:  Negative for nausea and vomiting.   Musculoskeletal:  Negative for muscle ache.   Skin:  Negative for rash.   Neurological:  Negative for dizziness, light-headedness, passing out, disorientation and altered mental status.   Hematologic/Lymphatic: Negative for swollen lymph nodes.   Psychiatric/Behavioral:  Negative for altered mental status, disorientation and confusion.     Objective:     Physical Exam   Constitutional: She is oriented to person, place, and time. She appears well-developed. She is cooperative.  Non-toxic appearance. She does not appear ill. No distress.   HENT:   Head: Normocephalic and atraumatic.   Ears:   Right Ear: Hearing, tympanic membrane, external ear and ear canal normal.   Left Ear: Hearing, external ear and ear canal normal. Tympanic membrane is erythematous and bulging.   Ears:    Nose: Rhinorrhea and congestion " present. No mucosal edema or nasal deformity. No epistaxis. Right sinus exhibits no maxillary sinus tenderness and no frontal sinus tenderness. Left sinus exhibits no maxillary sinus tenderness and no frontal sinus tenderness.   Mouth/Throat: Uvula is midline, oropharynx is clear and moist and mucous membranes are normal. Mucous membranes are moist. No trismus in the jaw. Normal dentition. No uvula swelling. No oropharyngeal exudate, posterior oropharyngeal edema, posterior oropharyngeal erythema, tonsillar abscesses or cobblestoning.   Eyes: Conjunctivae and lids are normal. No scleral icterus.   Neck: Trachea normal and phonation normal. Neck supple. No edema present. No erythema present. No neck rigidity present.   Cardiovascular: Normal rate, regular rhythm, normal heart sounds and normal pulses.   Pulmonary/Chest: Effort normal and breath sounds normal. No accessory muscle usage or stridor. No respiratory distress. She has no decreased breath sounds. She has no rhonchi.   Abdominal: Normal appearance.   Musculoskeletal: Normal range of motion.         General: No deformity. Normal range of motion.   Lymphadenopathy:     She has no cervical adenopathy.   Neurological: no focal deficit. She is alert and oriented to person, place, and time. She exhibits normal muscle tone. Coordination normal.   Skin: Skin is warm, dry, intact, not diaphoretic and not pale. Capillary refill takes 2 to 3 seconds.   Psychiatric: Her speech is normal and behavior is normal. Judgment and thought content normal.   Nursing note and vitals reviewed.    Assessment:     1. Viral URI with cough    2. Acute dysfunction of left eustachian tube    3. Acute otalgia, left    4. History of hypertension    5. History of diabetes mellitus, type II        Plan:       Viral URI with cough  -     azelastine (ASTELIN) 137 mcg (0.1 %) nasal spray; 1 spray (137 mcg total) by Nasal route 2 (two) times daily.  Dispense: 30 mL; Refill: 0  -      levocetirizine (XYZAL) 5 MG tablet; Take 1 tablet (5 mg total) by mouth daily as needed for Allergies.  Dispense: 7 tablet; Refill: 0  -     benzonatate (TESSALON) 100 MG capsule; Take 1 capsule (100 mg total) by mouth 3 (three) times daily as needed for Cough.  Dispense: 21 capsule; Refill: 0  -     promethazine-dextromethorphan (PROMETHAZINE-DM) 6.25-15 mg/5 mL Syrp; Take 5 mLs by mouth nightly as needed (cough). Take as needed for nighttime coughing  Dispense: 118 mL; Refill: 0  -     dextromethorphan-guaiFENesin  mg Tab; Take 1 tablet by mouth every 4 (four) hours as needed (cough/congestion).  Dispense: 30 each; Refill: 0    Acute dysfunction of left eustachian tube  -     azelastine (ASTELIN) 137 mcg (0.1 %) nasal spray; 1 spray (137 mcg total) by Nasal route 2 (two) times daily.  Dispense: 30 mL; Refill: 0  -     levocetirizine (XYZAL) 5 MG tablet; Take 1 tablet (5 mg total) by mouth daily as needed for Allergies.  Dispense: 7 tablet; Refill: 0  -     dexAMETHasone injection 8 mg    Acute otalgia, left  -     azelastine (ASTELIN) 137 mcg (0.1 %) nasal spray; 1 spray (137 mcg total) by Nasal route 2 (two) times daily.  Dispense: 30 mL; Refill: 0  -     levocetirizine (XYZAL) 5 MG tablet; Take 1 tablet (5 mg total) by mouth daily as needed for Allergies.  Dispense: 7 tablet; Refill: 0  -     dexAMETHasone injection 8 mg    History of hypertension    History of diabetes mellitus, type II           I have discussed the physical exam findings with the patient. I do not suspect covid or influenza. We discussed symptom monitoring, conservative care methods, medication use, and follow up orders. She verbalized understanding and agreement with the plan of care.

## 2023-05-31 ENCOUNTER — OFFICE VISIT (OUTPATIENT)
Dept: RHEUMATOLOGY | Facility: CLINIC | Age: 58
End: 2023-05-31
Payer: MEDICAID

## 2023-05-31 VITALS
BODY MASS INDEX: 43.48 KG/M2 | HEIGHT: 64 IN | HEART RATE: 61 BPM | DIASTOLIC BLOOD PRESSURE: 86 MMHG | WEIGHT: 254.69 LBS | SYSTOLIC BLOOD PRESSURE: 176 MMHG | OXYGEN SATURATION: 99 %

## 2023-05-31 DIAGNOSIS — M15.9 PRIMARY OSTEOARTHRITIS INVOLVING MULTIPLE JOINTS: ICD-10-CM

## 2023-05-31 DIAGNOSIS — Z71.89 COUNSELING AND COORDINATION OF CARE: ICD-10-CM

## 2023-05-31 DIAGNOSIS — M79.7 FIBROMYALGIA: Primary | ICD-10-CM

## 2023-05-31 DIAGNOSIS — Z79.1 NSAID LONG-TERM USE: ICD-10-CM

## 2023-05-31 DIAGNOSIS — E66.01 MORBID OBESITY: ICD-10-CM

## 2023-05-31 PROCEDURE — 3008F PR BODY MASS INDEX (BMI) DOCUMENTED: ICD-10-PCS | Mod: CPTII,,, | Performed by: INTERNAL MEDICINE

## 2023-05-31 PROCEDURE — 3079F DIAST BP 80-89 MM HG: CPT | Mod: CPTII,,, | Performed by: INTERNAL MEDICINE

## 2023-05-31 PROCEDURE — 3077F PR MOST RECENT SYSTOLIC BLOOD PRESSURE >= 140 MM HG: ICD-10-PCS | Mod: CPTII,,, | Performed by: INTERNAL MEDICINE

## 2023-05-31 PROCEDURE — 3008F BODY MASS INDEX DOCD: CPT | Mod: CPTII,,, | Performed by: INTERNAL MEDICINE

## 2023-05-31 PROCEDURE — 99999 PR PBB SHADOW E&M-EST. PATIENT-LVL IV: CPT | Mod: PBBFAC,,, | Performed by: INTERNAL MEDICINE

## 2023-05-31 PROCEDURE — 3077F SYST BP >= 140 MM HG: CPT | Mod: CPTII,,, | Performed by: INTERNAL MEDICINE

## 2023-05-31 PROCEDURE — 4010F PR ACE/ARB THEARPY RXD/TAKEN: ICD-10-PCS | Mod: CPTII,,, | Performed by: INTERNAL MEDICINE

## 2023-05-31 PROCEDURE — 99999 PR PBB SHADOW E&M-EST. PATIENT-LVL IV: ICD-10-PCS | Mod: PBBFAC,,, | Performed by: INTERNAL MEDICINE

## 2023-05-31 PROCEDURE — 3079F PR MOST RECENT DIASTOLIC BLOOD PRESSURE 80-89 MM HG: ICD-10-PCS | Mod: CPTII,,, | Performed by: INTERNAL MEDICINE

## 2023-05-31 PROCEDURE — 4010F ACE/ARB THERAPY RXD/TAKEN: CPT | Mod: CPTII,,, | Performed by: INTERNAL MEDICINE

## 2023-05-31 PROCEDURE — 1159F MED LIST DOCD IN RCRD: CPT | Mod: CPTII,,, | Performed by: INTERNAL MEDICINE

## 2023-05-31 PROCEDURE — 1159F PR MEDICATION LIST DOCUMENTED IN MEDICAL RECORD: ICD-10-PCS | Mod: CPTII,,, | Performed by: INTERNAL MEDICINE

## 2023-05-31 PROCEDURE — 99214 OFFICE O/P EST MOD 30 MIN: CPT | Mod: S$PBB,,, | Performed by: INTERNAL MEDICINE

## 2023-05-31 PROCEDURE — 99214 PR OFFICE/OUTPT VISIT, EST, LEVL IV, 30-39 MIN: ICD-10-PCS | Mod: S$PBB,,, | Performed by: INTERNAL MEDICINE

## 2023-05-31 PROCEDURE — 99214 OFFICE O/P EST MOD 30 MIN: CPT | Mod: PBBFAC,PO | Performed by: INTERNAL MEDICINE

## 2023-05-31 RX ORDER — TIZANIDINE 4 MG/1
TABLET ORAL
COMMUNITY
Start: 2022-11-08 | End: 2023-05-31 | Stop reason: SDUPTHER

## 2023-05-31 RX ORDER — ROSUVASTATIN CALCIUM 5 MG/1
5 TABLET, COATED ORAL NIGHTLY
COMMUNITY
Start: 2023-03-12

## 2023-05-31 RX ORDER — LANOLIN ALCOHOL/MO/W.PET/CERES
1 CREAM (GRAM) TOPICAL EVERY MORNING
COMMUNITY
Start: 2023-05-03

## 2023-05-31 RX ORDER — TIZANIDINE 4 MG/1
TABLET ORAL
Qty: 90 TABLET | Refills: 6 | Status: SHIPPED | OUTPATIENT
Start: 2023-05-31 | End: 2023-11-29 | Stop reason: SDUPTHER

## 2023-05-31 RX ORDER — UBROGEPANT 100 MG/1
TABLET ORAL
COMMUNITY
Start: 2022-10-07

## 2023-05-31 RX ORDER — NABUMETONE 750 MG/1
750 TABLET, FILM COATED ORAL 2 TIMES DAILY
Qty: 60 TABLET | Refills: 6 | Status: SHIPPED | OUTPATIENT
Start: 2023-05-31 | End: 2023-11-29 | Stop reason: SDUPTHER

## 2023-05-31 RX ORDER — FAMOTIDINE 20 MG/1
20 TABLET, FILM COATED ORAL 2 TIMES DAILY
COMMUNITY
Start: 2023-05-20

## 2023-05-31 RX ORDER — METHYLPREDNISOLONE 4 MG/1
TABLET ORAL
Qty: 1 EACH | Refills: 0 | Status: SHIPPED | OUTPATIENT
Start: 2023-05-31

## 2023-05-31 RX ORDER — BENZONATATE 100 MG/1
100 CAPSULE ORAL
COMMUNITY
Start: 2023-05-10

## 2023-05-31 NOTE — PROGRESS NOTES
RHEUMATOLOGY OUTPATIENT CLINIC NOTE    5/31/2023    Attending Rheumatologist: Erik Carrasco  Primary Care Provider: Primary Doctor No   Physician Requesting Consultation: No referring provider defined for this encounter.  Chief Complaint/Reason For Consultation:  Fibromyalgia      Subjective:       HPI  Sanaz Barr is a 57 y.o. White female with medical history noted below who presents for evaluation of joint pain.   Patient presents for evaluation of joint pain and myalgias. She notes the myalgias date back to when she was 15 yo. Joint pain has been present over the last 25 years. Notes wide spread pain but worse areas are hands and knees. Reports Hx of Frozen shoulder bilaterally, has gone thru PT/CSI extensively, and has improved. Prior surgeries of the toes for hammer toes. Paraesthesias all overs. She reports jean horses all over as well. Occasional hand swelling. Morning stiffness lasting few hours. Long days worsen her pain, NSAIDs provide little relief. Brain fog/forgetful, dizziness, wt gain over the last 4-5 months, constipation/diarrhea, RLS, menopause now but prior menstrual irregularities, HA/Migraines, fatigue, poor sleep (on CPAP), Anxiety. No rash, fevers, wt loss, Raynaud's, Oral/Nasal Ulcers, SOB, Chest Pain, Depression. Reports Hx of Fibromyalgia, prior use of Lyrica, Gabapentin, Cymbalta, Elavil.     Today  Patient here for follow up.   Last visit meds switched and new regimen: Naltrexone, Nabumetone and Zanaflex. She notes this combo works great. Ran out of meds last month and noticed recurrence of some symptoms, though was more active as well. Otherwise stable, tolerating meds.     Review of Systems   Constitutional:  Negative for chills, fatigue, fever and unexpected weight change.   HENT:  Negative for mouth sores and trouble swallowing.    Eyes:  Negative for redness and eye dryness.   Respiratory:  Negative for cough and shortness of breath.    Cardiovascular:  Negative  for chest pain.   Gastrointestinal:  Negative for abdominal distention, constipation, diarrhea, nausea and vomiting.   Genitourinary:  Negative for dysuria, genital sores and vaginal dryness.   Musculoskeletal:  Negative for arthralgias, back pain, gait problem, joint swelling, leg pain, myalgias, neck pain, neck stiffness and joint deformity.   Integumentary:  Negative for rash.   Neurological:  Negative for weakness, numbness and headaches.   Hematological:  Negative for adenopathy. Does not bruise/bleed easily.   Psychiatric/Behavioral:  Negative for confusion, decreased concentration and sleep disturbance. The patient is not nervous/anxious.    All other systems reviewed and are negative.     Chronic comorbid conditions affecting medical decision making today:  Past Medical History:   Diagnosis Date    Diabetes mellitus, type 2      Past Surgical History:   Procedure Laterality Date     SECTION      CHOLECYSTECTOMY      HAND SURGERY      Polyh removal       History reviewed. No pertinent family history.  Social History     Substance and Sexual Activity   Alcohol Use None     Social History     Tobacco Use   Smoking Status Not on file   Smokeless Tobacco Not on file     Social History     Substance and Sexual Activity   Drug Use Not on file       Current Outpatient Medications:     acetaminophen (TYLENOL) 500 MG tablet, Take 500 mg by mouth., Disp: , Rfl:     azelastine (ASTELIN) 137 mcg (0.1 %) nasal spray, 1 spray (137 mcg total) by Nasal route 2 (two) times daily., Disp: 30 mL, Rfl: 0    benazepril (LOTENSIN) 40 MG tablet, Take 40 mg by mouth once daily., Disp: , Rfl:     benzonatate (TESSALON) 100 MG capsule, Take 100 mg by mouth., Disp: , Rfl:     blood sugar diagnostic (ONETOUCH ULTRA BLUE TEST STRIP MISC), by Misc.(Non-Drug; Combo Route) route., Disp: , Rfl:     blood-glucose meter kit, by Other route. Use as instructed, Disp: , Rfl:     chlorthalidone (HYGROTEN) 25 MG Tab, Take 25 mg by mouth once  daily., Disp: , Rfl:     cholecalciferol, vitamin D3, capsule/tablet, Take by mouth once daily., Disp: , Rfl:     cyanocobalamin 1,000 mcg/mL injection, Inject into the muscle., Disp: , Rfl:     famotidine (PEPCID) 20 MG tablet, Take 20 mg by mouth 2 (two) times daily., Disp: , Rfl:     hydroCHLOROthiazide (HYDRODIURIL) 12.5 MG Tab, Take 12.5 mg by mouth., Disp: , Rfl:     lancets 30 gauge Misc, by Misc.(Non-Drug; Combo Route) route., Disp: , Rfl:     levocetirizine (XYZAL) 5 MG tablet, Take 1 tablet (5 mg total) by mouth daily as needed for Allergies., Disp: 7 tablet, Rfl: 0    levothyroxine (SYNTHROID) 50 MCG tablet, Take 50 mcg by mouth once daily., Disp: , Rfl:     MAGNESIUM ORAL, Take by mouth., Disp: , Rfl:     magnesium oxide (MAG-OX) 400 mg (241.3 mg magnesium) tablet, Take 1 tablet by mouth every morning., Disp: , Rfl:     potassium chloride (KLOR-CON) 20 mEq Pack, Take 20 mEq by mouth., Disp: , Rfl:     propranoloL (INDERAL) 40 MG tablet, Take 40 mg by mouth daily as needed., Disp: , Rfl:     rosuvastatin (CRESTOR) 5 MG tablet, Take 5 mg by mouth every evening., Disp: , Rfl:     semaglutide (OZEMPIC) 0.25 mg or 0.5 mg(2 mg/1.5 mL) PnIj, Inject 0.25 mg into the skin every 7 days., Disp: , Rfl:     traZODone (DESYREL) 100 MG tablet, Take 100 mg by mouth every evening., Disp: , Rfl:     ubrogepant (UBRELVY) 100 mg tablet, TAKE ONE TABLET BY MOUTH AT ONSET OF MIGRAINE. IF SYMPTOMS PERSIST, A SECOND DOSE MAY BE TAKEN IN 2 HOURS. DO NOT EXCEED 2 DOSES IN A 24 HOUR PERIOD, UNLESS OTHERWISE INSTRUCTED BY YOUR PHYSICIAN, Disp: , Rfl:     albuterol (PROVENTIL/VENTOLIN HFA) 90 mcg/actuation inhaler, Inhale 2 puffs into the lungs every 4 (four) hours as needed for Wheezing or Shortness of Breath., Disp: 1 Inhaler, Rfl: 0    azithromycin (ZITHROMAX Z-GREGORY) 250 MG tablet, Take 2 tablets PO QD on day one; take 1 tablet PO QD on days 2-5., Disp: 6 tablet, Rfl: 0    cyclobenzaprine (FLEXERIL) 10 MG tablet, Take 10 mg by  mouth 3 (three) times daily as needed for Muscle spasms., Disp: , Rfl:     diclofenac sodium (VOLTAREN) 1 % Gel, Apply 2 g topically 4 (four) times daily., Disp: , Rfl:     metFORMIN (FORTAMET) 500 mg 24 hr tablet, Take 500 mg by mouth daily with breakfast., Disp: , Rfl:     methylPREDNISolone (MEDROL DOSEPACK) 4 mg tablet, use as directed, Disp: 1 each, Rfl: 0    metoclopramide HCl (REGLAN) 5 MG tablet, Take 5 mg by mouth 4 (four) times daily., Disp: , Rfl:     nabumetone (RELAFEN) 750 MG tablet, Take 1 tablet (750 mg total) by mouth 2 (two) times daily., Disp: 60 tablet, Rfl: 6    natrexone tablet 4 mg, Take 1 tablet (4 mg total) by mouth every evening., Disp: 30 tablet, Rfl: 5    pantoprazole (PROTONIX) 40 MG tablet, Take 40 mg by mouth once daily., Disp: , Rfl:     propranoloL (INDERAL) 20 MG tablet, Take 20 mg by mouth 2 (two) times daily., Disp: , Rfl:     tiZANidine (ZANAFLEX) 4 MG tablet, TAKE 1 TABLET BY MOUTH EVERY 6 HOURS AS NEEDED FOR MYALGIA/SPASMS, Disp: 90 tablet, Rfl: 6     Objective:         Vitals:    05/31/23 1111   BP: (!) 176/86   Pulse: 61     Physical Exam  Can make fist, no synovitis, puffy MCP with TTP  Shoulder with limited ROM  Knee crepitus   Negative ankle/MTP  Tender Points:  No   Yes  []    [x]   Low cervical anterior aspect    []    [x]   Costochondral Junction   []    [x]   Lateral Epicondyle  []    [x]   Suboccipital   []    [x]   Trapezius   []    [x]   Supraspinatus   []    [x]   Gluteal   []    [x]   Greater trochanter  []    [x]   Knee    Reviewed old and all outside pertinent medical records available.    All lab results personally reviewed and interpreted by me.  Lab Results   Component Value Date    WBC 4.1 11/17/2022    HGB 11.7 11/17/2022    HCT 35.3 11/17/2022    MCV 92 09/16/2022    MCH 29.5 11/17/2022    MCHC 32.7 09/16/2022    RDW 13.3 11/17/2022     11/17/2022    MPV 11.3 09/16/2022    NEUTROABS 1.9 11/17/2022    LYMPHOPCT 44 11/17/2022       Lab Results    Component Value Date     09/16/2022    K 3.6 09/16/2022     09/16/2022    CO2 30 (H) 09/16/2022     (H) 09/16/2022    BUN 20 09/16/2022    CALCIUM 9.3 09/16/2022    PROT 6.7 09/16/2022    ALBUMIN 3.9 09/16/2022    BILITOT 1.0 09/16/2022    AST 17 09/16/2022    ALKPHOS 67 09/16/2022    ALT 22 09/16/2022       No results found for: COLORU, APPEARANCEUA, SPECGRAV, PHUR, PHUA, PROTEINUA, GLUCOSEU, KETONESU, BLOODU, LEUKOCYTESUR, NITRITE, UROBILINOGEN    Lab Results   Component Value Date    CRP 3.8 09/16/2022       Lab Results   Component Value Date    SEDRATE 11 09/16/2022       Lab Results   Component Value Date    RF <13.0 09/16/2022    SEDRATE 11 09/16/2022       No components found for: 25OHVITDTOT, 01KVIJLH1, 42TLPYUN9, METHODNOTE    Lab Results   Component Value Date    URICACID 8.0 (H) 09/16/2022       No components found for: TSPOTTB        Imaging:  All imaging reviewed and independently interpreted by me.         ASSESSMENT / PLAN:     Sanaz Barr is a 57 y.o. White female with:      1. Fibromyalgia  - improving   - Gabapentin stopped due to low BP, Robaxin and Flexeril lost efficacy, Meloxicam no change in pain  - continue Naltrexone, Nabumetone and Zanaflex   - sleep hygiene and stress management reinforced   - reassurance and exercise     2. Osteoarthritis  - in addition to FM she has OA which is adding to pain and will make pain goals harder to achieve  - chronic   - NSAIDs/Tylenol PRN  - wt loss  - reassurance and exercise     3. Chronic NSAID use  - no history of GI bleed or coronary artery disease.  - previous labs without features of CKD.  - clinical significance side effect of long-term NSAID use discussed in detail.  - recommended for alternative therapies for pain management.    4. Other specified counseling  - over 10 minutes spent regarding below topics:  - Immunization counseling done.  - Weight loss counseling done.  - Nutrition and exercise counseling.  - Limitation of  alcohol consumption.  - Regular exercise:  Aerobic and resistance.  - Medication counseling provided.    5. Morbid Obesity  - would benefit from decreasing at least 10% of body weight.  - recommended goal of losing 1 lb per week.  - consider nutritionist evaluation.      Follow up in about 6 months (around 11/30/2023).    Method of contact with patient concerns: [facility]    Disclaimer:  This note is prepared using voice recognition software and as such is likely to have errors and has not been proof read. Please contact me for questions.     Time spent: 30 minutes in face to face discussion concerning diagnosis, prognosis, review of lab and test results, benefits of treatment as well as management of disease, counseling of patient and coordination of care between various health care providers.  Greater than half the time spent was used for coordination of care and counseling of patient.    Erik Carrasco M.D.  Rheumatology Department   [facility]

## 2023-07-24 ENCOUNTER — HOSPITAL ENCOUNTER (OUTPATIENT)
Dept: RADIOLOGY | Facility: HOSPITAL | Age: 58
Discharge: HOME | End: 2023-07-24
Attending: INTERNAL MEDICINE
Payer: MEDICAID

## 2023-07-24 ENCOUNTER — TELEPHONE (OUTPATIENT)
Dept: HEMATOLOGY/ONCOLOGY | Facility: CLINIC | Age: 58
End: 2023-07-24
Payer: MEDICAID

## 2023-07-24 DIAGNOSIS — M79.89 LEFT LEG SWELLING: ICD-10-CM

## 2023-07-24 DIAGNOSIS — M79.89 LEFT LEG SWELLING: Primary | ICD-10-CM

## 2023-07-24 PROCEDURE — 93971 EXTREMITY STUDY: CPT | Mod: TC,PO,LT

## 2023-07-24 RX ORDER — SULFAMETHOXAZOLE AND TRIMETHOPRIM 400; 80 MG/1; MG/1
1 TABLET ORAL 2 TIMES DAILY
Qty: 20 TABLET | Refills: 0 | Status: SHIPPED | OUTPATIENT
Start: 2023-07-24 | End: 2023-08-03

## 2023-07-24 NOTE — NURSING
Per Dr. Ramírez, patient needs US. She has been scheduled for US [place] in [place]. Lay navigator spoke to the patient and provided appt info.

## 2023-11-29 ENCOUNTER — OFFICE VISIT (OUTPATIENT)
Dept: RHEUMATOLOGY | Facility: CLINIC | Age: 58
End: 2023-11-29
Payer: MEDICAID

## 2023-11-29 VITALS
DIASTOLIC BLOOD PRESSURE: 60 MMHG | WEIGHT: 252 LBS | OXYGEN SATURATION: 95 % | BODY MASS INDEX: 43.02 KG/M2 | SYSTOLIC BLOOD PRESSURE: 137 MMHG | HEART RATE: 51 BPM | HEIGHT: 64 IN

## 2023-11-29 DIAGNOSIS — Z79.1 NSAID LONG-TERM USE: ICD-10-CM

## 2023-11-29 DIAGNOSIS — Z71.89 COUNSELING AND COORDINATION OF CARE: ICD-10-CM

## 2023-11-29 DIAGNOSIS — M79.7 FIBROMYALGIA: ICD-10-CM

## 2023-11-29 DIAGNOSIS — M15.9 PRIMARY OSTEOARTHRITIS INVOLVING MULTIPLE JOINTS: Primary | ICD-10-CM

## 2023-11-29 DIAGNOSIS — E66.01 MORBID OBESITY: ICD-10-CM

## 2023-11-29 PROCEDURE — 4010F PR ACE/ARB THEARPY RXD/TAKEN: ICD-10-PCS | Mod: CPTII,,, | Performed by: INTERNAL MEDICINE

## 2023-11-29 PROCEDURE — 1159F PR MEDICATION LIST DOCUMENTED IN MEDICAL RECORD: ICD-10-PCS | Mod: CPTII,,, | Performed by: INTERNAL MEDICINE

## 2023-11-29 PROCEDURE — 3075F PR MOST RECENT SYSTOLIC BLOOD PRESS GE 130-139MM HG: ICD-10-PCS | Mod: CPTII,,, | Performed by: INTERNAL MEDICINE

## 2023-11-29 PROCEDURE — 3008F PR BODY MASS INDEX (BMI) DOCUMENTED: ICD-10-PCS | Mod: CPTII,,, | Performed by: INTERNAL MEDICINE

## 2023-11-29 PROCEDURE — 1159F MED LIST DOCD IN RCRD: CPT | Mod: CPTII,,, | Performed by: INTERNAL MEDICINE

## 2023-11-29 PROCEDURE — 3078F PR MOST RECENT DIASTOLIC BLOOD PRESSURE < 80 MM HG: ICD-10-PCS | Mod: CPTII,,, | Performed by: INTERNAL MEDICINE

## 2023-11-29 PROCEDURE — 3044F PR MOST RECENT HEMOGLOBIN A1C LEVEL <7.0%: ICD-10-PCS | Mod: CPTII,,, | Performed by: INTERNAL MEDICINE

## 2023-11-29 PROCEDURE — 4010F ACE/ARB THERAPY RXD/TAKEN: CPT | Mod: CPTII,,, | Performed by: INTERNAL MEDICINE

## 2023-11-29 PROCEDURE — 3008F BODY MASS INDEX DOCD: CPT | Mod: CPTII,,, | Performed by: INTERNAL MEDICINE

## 2023-11-29 PROCEDURE — 99999 PR PBB SHADOW E&M-EST. PATIENT-LVL V: CPT | Mod: PBBFAC,,, | Performed by: INTERNAL MEDICINE

## 2023-11-29 PROCEDURE — 99214 OFFICE O/P EST MOD 30 MIN: CPT | Mod: S$PBB,,, | Performed by: INTERNAL MEDICINE

## 2023-11-29 PROCEDURE — 99215 OFFICE O/P EST HI 40 MIN: CPT | Mod: PBBFAC,PO | Performed by: INTERNAL MEDICINE

## 2023-11-29 PROCEDURE — 99999 PR PBB SHADOW E&M-EST. PATIENT-LVL V: ICD-10-PCS | Mod: PBBFAC,,, | Performed by: INTERNAL MEDICINE

## 2023-11-29 PROCEDURE — 3044F HG A1C LEVEL LT 7.0%: CPT | Mod: CPTII,,, | Performed by: INTERNAL MEDICINE

## 2023-11-29 PROCEDURE — 99214 PR OFFICE/OUTPT VISIT, EST, LEVL IV, 30-39 MIN: ICD-10-PCS | Mod: S$PBB,,, | Performed by: INTERNAL MEDICINE

## 2023-11-29 PROCEDURE — 3075F SYST BP GE 130 - 139MM HG: CPT | Mod: CPTII,,, | Performed by: INTERNAL MEDICINE

## 2023-11-29 PROCEDURE — 3078F DIAST BP <80 MM HG: CPT | Mod: CPTII,,, | Performed by: INTERNAL MEDICINE

## 2023-11-29 RX ORDER — DAPAGLIFLOZIN 5 MG/1
5 TABLET, FILM COATED ORAL
COMMUNITY
Start: 2023-11-03

## 2023-11-29 RX ORDER — CYCLOSPORINE 0.5 MG/ML
1 EMULSION OPHTHALMIC 2 TIMES DAILY
COMMUNITY

## 2023-11-29 RX ORDER — TIZANIDINE 4 MG/1
TABLET ORAL
Qty: 90 TABLET | Refills: 6 | Status: SHIPPED | OUTPATIENT
Start: 2023-11-29

## 2023-11-29 RX ORDER — ALLOPURINOL 100 MG/1
150 TABLET ORAL
COMMUNITY
Start: 2023-11-03 | End: 2024-11-02

## 2023-11-29 RX ORDER — NABUMETONE 750 MG/1
750 TABLET, FILM COATED ORAL 2 TIMES DAILY
Qty: 60 TABLET | Refills: 6 | Status: SHIPPED | OUTPATIENT
Start: 2023-11-29

## 2023-11-29 RX ORDER — PROPARACAINE HYDROCHLORIDE 5 MG/ML
1 SOLUTION/ DROPS OPHTHALMIC
COMMUNITY
Start: 2023-09-06

## 2023-11-29 NOTE — PROGRESS NOTES
RHEUMATOLOGY OUTPATIENT CLINIC NOTE    11/29/2023    Attending Rheumatologist: Erik Carrasco  Primary Care Provider: No, Primary Doctor   Physician Requesting Consultation: No referring provider defined for this encounter.  Chief Complaint/Reason For Consultation:  Joint Pain      Subjective:       HPI  Sanaz Barr is a 58 y.o. White female with medical history noted below who presents for evaluation of joint pain.   Patient presents for evaluation of joint pain and myalgias. She notes the myalgias date back to when she was 15 yo. Joint pain has been present over the last 25 years. Notes wide spread pain but worse areas are hands and knees. Reports Hx of Frozen shoulder bilaterally, has gone thru PT/CSI extensively, and has improved. Prior surgeries of the toes for hammer toes. Paraesthesias all overs. She reports jean horses all over as well. Occasional hand swelling. Morning stiffness lasting few hours. Long days worsen her pain, NSAIDs provide little relief. Brain fog/forgetful, dizziness, wt gain over the last 4-5 months, constipation/diarrhea, RLS, menopause now but prior menstrual irregularities, HA/Migraines, fatigue, poor sleep (on CPAP), Anxiety. No rash, fevers, wt loss, Raynaud's, Oral/Nasal Ulcers, SOB, Chest Pain, Depression. Reports Hx of Fibromyalgia, prior use of Lyrica, Gabapentin, Cymbalta, Elavil.     Today  Patient here for follow up.   Last visit meds for FM continued. She notes she has been doing well relating to this. Does not newly diagnosed gout, two flares most recent with Thanksgiving. On Allopurinol by PCP, tolerating meds.    Review of Systems   Constitutional:  Negative for chills, fatigue, fever and unexpected weight change.   HENT:  Negative for mouth sores and trouble swallowing.    Eyes:  Negative for redness and eye dryness.   Respiratory:  Negative for cough and shortness of breath.    Cardiovascular:  Negative for chest pain.   Gastrointestinal:  Negative for  abdominal distention, constipation, diarrhea, nausea and vomiting.   Genitourinary:  Negative for dysuria, genital sores and vaginal dryness.   Musculoskeletal:  Negative for arthralgias, back pain, gait problem, joint swelling, leg pain, myalgias, neck pain, neck stiffness and joint deformity.   Integumentary:  Negative for rash.   Neurological:  Negative for weakness, numbness and headaches.   Hematological:  Negative for adenopathy. Does not bruise/bleed easily.   Psychiatric/Behavioral:  Negative for confusion, decreased concentration and sleep disturbance. The patient is not nervous/anxious.    All other systems reviewed and are negative.       Chronic comorbid conditions affecting medical decision making today:  Past Medical History:   Diagnosis Date    Diabetes mellitus, type 2      Past Surgical History:   Procedure Laterality Date     SECTION      CHOLECYSTECTOMY      HAND SURGERY      Polyh removal       History reviewed. No pertinent family history.  Social History     Substance and Sexual Activity   Alcohol Use None     Social History     Tobacco Use   Smoking Status Not on file   Smokeless Tobacco Not on file     Social History     Substance and Sexual Activity   Drug Use Not on file       Current Outpatient Medications:     acetaminophen (TYLENOL) 500 MG tablet, Take 500 mg by mouth., Disp: , Rfl:     allopurinoL (ZYLOPRIM) 100 MG tablet, Take 150 mg by mouth., Disp: , Rfl:     benazepril (LOTENSIN) 40 MG tablet, Take 40 mg by mouth once daily., Disp: , Rfl:     blood sugar diagnostic (ONETOUCH ULTRA BLUE TEST STRIP MISC), by Misc.(Non-Drug; Combo Route) route., Disp: , Rfl:     blood-glucose meter kit, by Other route. Use as instructed, Disp: , Rfl:     chlorthalidone (HYGROTEN) 25 MG Tab, Take 25 mg by mouth once daily., Disp: , Rfl:     cholecalciferol, vitamin D3, capsule/tablet, Take by mouth once daily., Disp: , Rfl:     cyanocobalamin 1,000 mcg/mL injection, Inject into the muscle.,  Disp: , Rfl:     dapagliflozin propanediol (FARXIGA) 5 mg Tab tablet, Take 5 mg by mouth., Disp: , Rfl:     famotidine (PEPCID) 20 MG tablet, Take 20 mg by mouth 2 (two) times daily., Disp: , Rfl:     hydroCHLOROthiazide (HYDRODIURIL) 12.5 MG Tab, Take 12.5 mg by mouth., Disp: , Rfl:     lancets 30 gauge Misc, by Misc.(Non-Drug; Combo Route) route., Disp: , Rfl:     levocetirizine (XYZAL) 5 MG tablet, Take 1 tablet (5 mg total) by mouth daily as needed for Allergies., Disp: 7 tablet, Rfl: 0    levothyroxine (SYNTHROID) 50 MCG tablet, Take 50 mcg by mouth once daily., Disp: , Rfl:     MAGNESIUM ORAL, Take by mouth., Disp: , Rfl:     magnesium oxide (MAG-OX) 400 mg (241.3 mg magnesium) tablet, Take 1 tablet by mouth every morning., Disp: , Rfl:     pantoprazole (PROTONIX) 40 MG tablet, Take 40 mg by mouth once daily., Disp: , Rfl:     potassium chloride (KLOR-CON) 20 mEq Pack, Take 20 mEq by mouth., Disp: , Rfl:     proparacaine (ALCAINE) 0.5 % ophthalmic solution, Apply 1 drop to eye., Disp: , Rfl:     propranoloL (INDERAL) 40 MG tablet, Take 40 mg by mouth daily as needed., Disp: , Rfl:     traZODone (DESYREL) 100 MG tablet, Take 100 mg by mouth every evening., Disp: , Rfl:     ubrogepant (UBRELVY) 100 mg tablet, TAKE ONE TABLET BY MOUTH AT ONSET OF MIGRAINE. IF SYMPTOMS PERSIST, A SECOND DOSE MAY BE TAKEN IN 2 HOURS. DO NOT EXCEED 2 DOSES IN A 24 HOUR PERIOD, UNLESS OTHERWISE INSTRUCTED BY YOUR PHYSICIAN, Disp: , Rfl:     albuterol (PROVENTIL/VENTOLIN HFA) 90 mcg/actuation inhaler, Inhale 2 puffs into the lungs every 4 (four) hours as needed for Wheezing or Shortness of Breath., Disp: 1 Inhaler, Rfl: 0    azelastine (ASTELIN) 137 mcg (0.1 %) nasal spray, 1 spray (137 mcg total) by Nasal route 2 (two) times daily., Disp: 30 mL, Rfl: 0    azithromycin (ZITHROMAX Z-GREGORY) 250 MG tablet, Take 2 tablets PO QD on day one; take 1 tablet PO QD on days 2-5., Disp: 6 tablet, Rfl: 0    benzonatate (TESSALON) 100 MG capsule,  Take 100 mg by mouth., Disp: , Rfl:     cyclobenzaprine (FLEXERIL) 10 MG tablet, Take 10 mg by mouth 3 (three) times daily as needed for Muscle spasms., Disp: , Rfl:     diclofenac sodium (VOLTAREN) 1 % Gel, Apply 2 g topically 4 (four) times daily., Disp: , Rfl:     metFORMIN (FORTAMET) 500 mg 24 hr tablet, Take 500 mg by mouth daily with breakfast., Disp: , Rfl:     methylPREDNISolone (MEDROL DOSEPACK) 4 mg tablet, use as directed (Patient not taking: Reported on 11/29/2023), Disp: 1 each, Rfl: 0    metoclopramide HCl (REGLAN) 5 MG tablet, Take 5 mg by mouth 4 (four) times daily., Disp: , Rfl:     nabumetone (RELAFEN) 750 MG tablet, Take 1 tablet (750 mg total) by mouth 2 (two) times daily., Disp: 60 tablet, Rfl: 6    natrexone tablet 4 mg, Take 1 tablet (4 mg total) by mouth every evening., Disp: 30 tablet, Rfl: 6    propranoloL (INDERAL) 20 MG tablet, Take 20 mg by mouth 2 (two) times daily., Disp: , Rfl:     RESTASIS 0.05 % ophthalmic emulsion, Place 1 drop into both eyes 2 (two) times daily., Disp: , Rfl:     rosuvastatin (CRESTOR) 5 MG tablet, Take 5 mg by mouth every evening., Disp: , Rfl:     semaglutide (OZEMPIC) 0.25 mg or 0.5 mg(2 mg/1.5 mL) PnIj, Inject 0.25 mg into the skin every 7 days., Disp: , Rfl:     tiZANidine (ZANAFLEX) 4 MG tablet, TAKE 1 TABLET BY MOUTH EVERY 6 HOURS AS NEEDED FOR MYALGIA/SPASMS, Disp: 90 tablet, Rfl: 6     Objective:         Vitals:    11/29/23 1148   BP: 137/60   Pulse: (!) 51     Physical Exam  Can make fist, no synovitis, puffy MCP with TTP  Shoulder with limited ROM  Knee crepitus   Negative ankle/MTP  Tender Points:  No   Yes  []    [x]   Low cervical anterior aspect    []    [x]   Costochondral Junction   []    [x]   Lateral Epicondyle  []    [x]   Suboccipital   []    [x]   Trapezius   []    [x]   Supraspinatus   []    [x]   Gluteal   []    [x]   Greater trochanter  []    [x]   Knee    Reviewed old and all outside pertinent medical records available.    All lab results  "personally reviewed and interpreted by me.  Lab Results   Component Value Date    WBC 4.6 10/16/2023    HGB 11.6 10/16/2023    HCT 35.2 10/16/2023    MCV 92 09/16/2022    MCH 30.1 10/16/2023    MCHC 32.7 09/16/2022    RDW 13.7 10/16/2023     10/16/2023    MPV 11.3 09/16/2022    NEUTROABS 2.7 10/16/2023    LYMPHOPCT 32 10/16/2023       Lab Results   Component Value Date     09/16/2022    K 3.6 09/16/2022     09/16/2022    CO2 30 (H) 09/16/2022     (H) 09/16/2022    BUN 20 09/16/2022    CALCIUM 9.3 09/16/2022    PROT 6.7 09/16/2022    ALBUMIN 3.9 09/16/2022    BILITOT 1.0 09/16/2022    AST 17 09/16/2022    ALKPHOS 67 09/16/2022    ALT 22 09/16/2022       No results found for: "COLORU", "APPEARANCEUA", "SPECGRAV", "PHUR", "PHUA", "PROTEINUA", "GLUCOSEU", "KETONESU", "BLOODU", "LEUKOCYTESUR", "NITRITE", "UROBILINOGEN"    Lab Results   Component Value Date    CRP 3.8 09/16/2022       Lab Results   Component Value Date    SEDRATE 11 09/16/2022       Lab Results   Component Value Date    RF <13.0 09/16/2022    SEDRATE 11 09/16/2022       No components found for: "25OHVITDTOT", "13VYBILL3", "62SAZQYF3", "METHODNOTE"    Lab Results   Component Value Date    URICACID 8.0 (H) 09/16/2022       No components found for: "TSPOTTB"        Imaging:  All imaging reviewed and independently interpreted by me.         ASSESSMENT / PLAN:     Sanaz Barr is a 58 y.o. White female with:      1. Fibromyalgia  - doing well  - Gabapentin stopped due to low BP, Robaxin and Flexeril lost efficacy, Meloxicam no change in pain  - continue Naltrexone, Nabumetone and Zanaflex   - sleep hygiene and stress management reinforced   - reassurance and exercise     2. Osteoarthritis  - in addition to FM she has OA which is adding to pain and will make pain goals harder to achieve  - chronic   - NSAIDs/Tylenol PRN  - wt loss  - reassurance and exercise     3. Chronic NSAID use  - no history of GI bleed or coronary artery " disease.  - previous labs without features of CKD.  - clinical significance side effect of long-term NSAID use discussed in detail.  - recommended for alternative therapies for pain management.    4. Other specified counseling  - over 10 minutes spent regarding below topics:  - Immunization counseling done.  - Weight loss counseling done.  - Nutrition and exercise counseling.  - Limitation of alcohol consumption.  - Regular exercise:  Aerobic and resistance.  - Medication counseling provided.    5. Morbid Obesity  - would benefit from decreasing at least 10% of body weight.  - recommended goal of losing 1 lb per week.  - consider nutritionist evaluation.      Follow up in about 3 months (around 2/29/2024).    Method of contact with patient concerns: [facility]    Disclaimer:  This note is prepared using voice recognition software and as such is likely to have errors and has not been proof read. Please contact me for questions.     Time spent: 30 minutes in face to face discussion concerning diagnosis, prognosis, review of lab and test results, benefits of treatment as well as management of disease, counseling of patient and coordination of care between various health care providers.  Greater than half the time spent was used for coordination of care and counseling of patient.    Erik Carrasco M.D.  Rheumatology Department   [facility]

## 2023-11-29 NOTE — PROGRESS NOTES
Answers submitted by the patient for this visit:  Rheumatology Questionnaire (Submitted on 11/27/2023)  fever: No  eye redness: Yes  mouth sores: No  headaches: Yes  shortness of breath: No  chest pain: No  trouble swallowing: No  diarrhea: Yes  constipation: Yes  unexpected weight change: No  genital sore: No  dysuria: No  During the last 3 days, have you had a skin rash?: No  Bruises or bleeds easily: No  cough: No